# Patient Record
Sex: FEMALE | Race: WHITE | NOT HISPANIC OR LATINO | Employment: UNEMPLOYED | URBAN - METROPOLITAN AREA
[De-identification: names, ages, dates, MRNs, and addresses within clinical notes are randomized per-mention and may not be internally consistent; named-entity substitution may affect disease eponyms.]

---

## 2017-01-04 ENCOUNTER — ALLSCRIPTS OFFICE VISIT (OUTPATIENT)
Dept: OTHER | Facility: OTHER | Age: 62
End: 2017-01-04

## 2017-01-04 DIAGNOSIS — M79.10 MYALGIA: ICD-10-CM

## 2017-01-18 ENCOUNTER — GENERIC CONVERSION - ENCOUNTER (OUTPATIENT)
Dept: OTHER | Facility: OTHER | Age: 62
End: 2017-01-18

## 2017-01-18 ENCOUNTER — APPOINTMENT (OUTPATIENT)
Dept: PHYSICAL THERAPY | Facility: OTHER | Age: 62
End: 2017-01-18
Payer: COMMERCIAL

## 2017-01-18 DIAGNOSIS — M79.10 MYALGIA: ICD-10-CM

## 2017-01-18 PROCEDURE — G8990 OTHER PT/OT CURRENT STATUS: HCPCS

## 2017-01-18 PROCEDURE — G8991 OTHER PT/OT GOAL STATUS: HCPCS

## 2017-01-18 PROCEDURE — 97164 PT RE-EVAL EST PLAN CARE: CPT

## 2017-01-18 PROCEDURE — 97140 MANUAL THERAPY 1/> REGIONS: CPT

## 2017-01-20 ENCOUNTER — APPOINTMENT (OUTPATIENT)
Dept: PHYSICAL THERAPY | Facility: OTHER | Age: 62
End: 2017-01-20
Payer: COMMERCIAL

## 2017-01-20 PROCEDURE — 97110 THERAPEUTIC EXERCISES: CPT

## 2017-01-20 PROCEDURE — 97140 MANUAL THERAPY 1/> REGIONS: CPT

## 2017-01-25 ENCOUNTER — APPOINTMENT (OUTPATIENT)
Dept: PHYSICAL THERAPY | Facility: OTHER | Age: 62
End: 2017-01-25
Payer: COMMERCIAL

## 2017-01-25 PROCEDURE — 97110 THERAPEUTIC EXERCISES: CPT

## 2017-01-25 PROCEDURE — 97140 MANUAL THERAPY 1/> REGIONS: CPT

## 2017-01-27 ENCOUNTER — APPOINTMENT (OUTPATIENT)
Dept: PHYSICAL THERAPY | Facility: OTHER | Age: 62
End: 2017-01-27
Payer: COMMERCIAL

## 2017-01-27 ENCOUNTER — APPOINTMENT (OUTPATIENT)
Dept: LAB | Facility: HOSPITAL | Age: 62
End: 2017-01-27
Attending: NEUROLOGICAL SURGERY
Payer: MEDICARE

## 2017-01-27 ENCOUNTER — TRANSCRIBE ORDERS (OUTPATIENT)
Dept: LAB | Facility: HOSPITAL | Age: 62
End: 2017-01-27

## 2017-01-27 DIAGNOSIS — D32.9 BENIGN NEOPLASM OF MENINGES (HCC): ICD-10-CM

## 2017-01-27 LAB
BUN SERPL-MCNC: 19 MG/DL (ref 5–25)
CREAT SERPL-MCNC: 1.03 MG/DL (ref 0.6–1.3)
GFR SERPL CREATININE-BSD FRML MDRD: 54.1 ML/MIN/1.73SQ M

## 2017-01-27 PROCEDURE — 84520 ASSAY OF UREA NITROGEN: CPT

## 2017-01-27 PROCEDURE — 82565 ASSAY OF CREATININE: CPT

## 2017-01-27 PROCEDURE — 97110 THERAPEUTIC EXERCISES: CPT

## 2017-01-27 PROCEDURE — 36415 COLL VENOUS BLD VENIPUNCTURE: CPT

## 2017-01-27 PROCEDURE — 97140 MANUAL THERAPY 1/> REGIONS: CPT

## 2017-02-01 ENCOUNTER — APPOINTMENT (OUTPATIENT)
Dept: PHYSICAL THERAPY | Facility: OTHER | Age: 62
End: 2017-02-01
Payer: COMMERCIAL

## 2017-02-01 ENCOUNTER — HOSPITAL ENCOUNTER (OUTPATIENT)
Dept: RADIOLOGY | Facility: HOSPITAL | Age: 62
Discharge: HOME | End: 2017-02-01
Attending: NEUROLOGICAL SURGERY
Payer: MEDICARE

## 2017-02-01 DIAGNOSIS — D32.9 BENIGN NEOPLASM OF MENINGES (HCC): ICD-10-CM

## 2017-02-01 PROCEDURE — A9585 GADOBUTROL INJECTION: HCPCS | Performed by: NEUROLOGICAL SURGERY

## 2017-02-01 PROCEDURE — 70553 MRI BRAIN STEM W/O & W/DYE: CPT

## 2017-02-01 RX ADMIN — GADOBUTROL 9 ML: 604.72 INJECTION INTRAVENOUS at 09:58

## 2017-02-03 ENCOUNTER — APPOINTMENT (OUTPATIENT)
Dept: PHYSICAL THERAPY | Facility: OTHER | Age: 62
End: 2017-02-03
Payer: COMMERCIAL

## 2017-02-08 ENCOUNTER — APPOINTMENT (OUTPATIENT)
Dept: PHYSICAL THERAPY | Facility: OTHER | Age: 62
End: 2017-02-08
Payer: COMMERCIAL

## 2017-02-08 PROCEDURE — 97140 MANUAL THERAPY 1/> REGIONS: CPT

## 2017-02-08 PROCEDURE — 97110 THERAPEUTIC EXERCISES: CPT

## 2017-02-10 ENCOUNTER — TRANSCRIBE ORDERS (OUTPATIENT)
Dept: ADMINISTRATIVE | Facility: HOSPITAL | Age: 62
End: 2017-02-10

## 2017-02-10 ENCOUNTER — APPOINTMENT (OUTPATIENT)
Dept: PHYSICAL THERAPY | Facility: OTHER | Age: 62
End: 2017-02-10
Payer: COMMERCIAL

## 2017-02-10 ENCOUNTER — ALLSCRIPTS OFFICE VISIT (OUTPATIENT)
Dept: OTHER | Facility: OTHER | Age: 62
End: 2017-02-10

## 2017-02-10 DIAGNOSIS — D32.9 BENIGN NEOPLASM OF MENINGES (HCC): ICD-10-CM

## 2017-02-10 DIAGNOSIS — D32.9 BENIGN NEOPLASM OF MENINGES (HCC): Primary | ICD-10-CM

## 2017-02-10 PROCEDURE — 97140 MANUAL THERAPY 1/> REGIONS: CPT

## 2017-02-10 PROCEDURE — 97110 THERAPEUTIC EXERCISES: CPT

## 2017-02-15 ENCOUNTER — APPOINTMENT (OUTPATIENT)
Dept: PHYSICAL THERAPY | Facility: OTHER | Age: 62
End: 2017-02-15
Payer: COMMERCIAL

## 2017-02-17 ENCOUNTER — APPOINTMENT (OUTPATIENT)
Dept: PHYSICAL THERAPY | Facility: OTHER | Age: 62
End: 2017-02-17
Payer: COMMERCIAL

## 2017-02-17 PROCEDURE — 97110 THERAPEUTIC EXERCISES: CPT

## 2017-02-17 PROCEDURE — 97140 MANUAL THERAPY 1/> REGIONS: CPT

## 2017-02-22 ENCOUNTER — APPOINTMENT (OUTPATIENT)
Dept: PHYSICAL THERAPY | Facility: OTHER | Age: 62
End: 2017-02-22
Payer: COMMERCIAL

## 2017-02-22 PROCEDURE — 97140 MANUAL THERAPY 1/> REGIONS: CPT

## 2017-02-22 PROCEDURE — 97110 THERAPEUTIC EXERCISES: CPT

## 2017-02-24 ENCOUNTER — APPOINTMENT (OUTPATIENT)
Dept: PHYSICAL THERAPY | Facility: OTHER | Age: 62
End: 2017-02-24
Payer: COMMERCIAL

## 2017-02-24 PROCEDURE — G8990 OTHER PT/OT CURRENT STATUS: HCPCS

## 2017-02-24 PROCEDURE — G8991 OTHER PT/OT GOAL STATUS: HCPCS

## 2017-02-24 PROCEDURE — 97140 MANUAL THERAPY 1/> REGIONS: CPT

## 2017-02-24 PROCEDURE — 97110 THERAPEUTIC EXERCISES: CPT

## 2017-03-01 ENCOUNTER — APPOINTMENT (OUTPATIENT)
Dept: PHYSICAL THERAPY | Facility: OTHER | Age: 62
End: 2017-03-01
Payer: COMMERCIAL

## 2017-03-01 PROCEDURE — 97110 THERAPEUTIC EXERCISES: CPT

## 2017-03-01 PROCEDURE — 97140 MANUAL THERAPY 1/> REGIONS: CPT

## 2017-03-03 ENCOUNTER — APPOINTMENT (OUTPATIENT)
Dept: PHYSICAL THERAPY | Facility: OTHER | Age: 62
End: 2017-03-03
Payer: COMMERCIAL

## 2017-03-03 PROCEDURE — 97110 THERAPEUTIC EXERCISES: CPT

## 2017-03-03 PROCEDURE — 97140 MANUAL THERAPY 1/> REGIONS: CPT

## 2017-03-08 ENCOUNTER — APPOINTMENT (OUTPATIENT)
Dept: PHYSICAL THERAPY | Facility: OTHER | Age: 62
End: 2017-03-08
Payer: COMMERCIAL

## 2017-03-08 PROCEDURE — 97110 THERAPEUTIC EXERCISES: CPT

## 2017-03-08 PROCEDURE — 97140 MANUAL THERAPY 1/> REGIONS: CPT

## 2017-03-10 ENCOUNTER — APPOINTMENT (OUTPATIENT)
Dept: PHYSICAL THERAPY | Facility: OTHER | Age: 62
End: 2017-03-10
Payer: COMMERCIAL

## 2017-03-15 ENCOUNTER — APPOINTMENT (OUTPATIENT)
Dept: PHYSICAL THERAPY | Facility: OTHER | Age: 62
End: 2017-03-15
Payer: COMMERCIAL

## 2017-03-17 ENCOUNTER — APPOINTMENT (OUTPATIENT)
Dept: PHYSICAL THERAPY | Facility: OTHER | Age: 62
End: 2017-03-17
Payer: COMMERCIAL

## 2017-03-17 PROCEDURE — 97140 MANUAL THERAPY 1/> REGIONS: CPT

## 2017-03-17 PROCEDURE — 97110 THERAPEUTIC EXERCISES: CPT

## 2017-03-22 ENCOUNTER — APPOINTMENT (OUTPATIENT)
Dept: PHYSICAL THERAPY | Facility: OTHER | Age: 62
End: 2017-03-22
Payer: COMMERCIAL

## 2017-03-22 PROCEDURE — 97110 THERAPEUTIC EXERCISES: CPT

## 2017-03-22 PROCEDURE — 97140 MANUAL THERAPY 1/> REGIONS: CPT

## 2017-03-24 ENCOUNTER — APPOINTMENT (OUTPATIENT)
Dept: PHYSICAL THERAPY | Facility: OTHER | Age: 62
End: 2017-03-24
Payer: COMMERCIAL

## 2017-03-29 ENCOUNTER — APPOINTMENT (OUTPATIENT)
Dept: PHYSICAL THERAPY | Facility: OTHER | Age: 62
End: 2017-03-29
Payer: COMMERCIAL

## 2017-03-29 PROCEDURE — 97110 THERAPEUTIC EXERCISES: CPT

## 2017-03-29 PROCEDURE — 97140 MANUAL THERAPY 1/> REGIONS: CPT

## 2017-03-31 ENCOUNTER — APPOINTMENT (OUTPATIENT)
Dept: PHYSICAL THERAPY | Facility: OTHER | Age: 62
End: 2017-03-31
Payer: COMMERCIAL

## 2017-03-31 PROCEDURE — 97140 MANUAL THERAPY 1/> REGIONS: CPT

## 2017-03-31 PROCEDURE — 97110 THERAPEUTIC EXERCISES: CPT

## 2017-04-07 ENCOUNTER — APPOINTMENT (OUTPATIENT)
Dept: PHYSICAL THERAPY | Facility: OTHER | Age: 62
End: 2017-04-07
Payer: COMMERCIAL

## 2017-04-07 PROCEDURE — 97110 THERAPEUTIC EXERCISES: CPT

## 2017-04-07 PROCEDURE — 97140 MANUAL THERAPY 1/> REGIONS: CPT

## 2017-04-12 ENCOUNTER — APPOINTMENT (OUTPATIENT)
Dept: PHYSICAL THERAPY | Facility: OTHER | Age: 62
End: 2017-04-12
Payer: COMMERCIAL

## 2017-04-12 PROCEDURE — 97110 THERAPEUTIC EXERCISES: CPT

## 2017-04-12 PROCEDURE — 97140 MANUAL THERAPY 1/> REGIONS: CPT

## 2017-04-14 ENCOUNTER — APPOINTMENT (OUTPATIENT)
Dept: PHYSICAL THERAPY | Facility: OTHER | Age: 62
End: 2017-04-14
Payer: COMMERCIAL

## 2017-04-14 ENCOUNTER — ALLSCRIPTS OFFICE VISIT (OUTPATIENT)
Dept: OTHER | Facility: OTHER | Age: 62
End: 2017-04-14

## 2017-04-14 PROCEDURE — 97110 THERAPEUTIC EXERCISES: CPT

## 2017-04-14 PROCEDURE — 97140 MANUAL THERAPY 1/> REGIONS: CPT

## 2017-04-14 PROCEDURE — G8990 OTHER PT/OT CURRENT STATUS: HCPCS

## 2017-04-14 PROCEDURE — G8991 OTHER PT/OT GOAL STATUS: HCPCS

## 2017-04-19 ENCOUNTER — APPOINTMENT (OUTPATIENT)
Dept: PHYSICAL THERAPY | Facility: OTHER | Age: 62
End: 2017-04-19
Payer: COMMERCIAL

## 2017-04-19 PROCEDURE — 97110 THERAPEUTIC EXERCISES: CPT

## 2017-04-19 PROCEDURE — 97140 MANUAL THERAPY 1/> REGIONS: CPT

## 2017-04-21 ENCOUNTER — APPOINTMENT (OUTPATIENT)
Dept: PHYSICAL THERAPY | Facility: OTHER | Age: 62
End: 2017-04-21
Payer: COMMERCIAL

## 2017-04-21 PROCEDURE — 97140 MANUAL THERAPY 1/> REGIONS: CPT

## 2017-04-21 PROCEDURE — 97110 THERAPEUTIC EXERCISES: CPT

## 2017-04-21 PROCEDURE — 97112 NEUROMUSCULAR REEDUCATION: CPT

## 2017-04-25 ENCOUNTER — GENERIC CONVERSION - ENCOUNTER (OUTPATIENT)
Dept: OTHER | Facility: OTHER | Age: 62
End: 2017-04-25

## 2017-04-26 ENCOUNTER — APPOINTMENT (OUTPATIENT)
Dept: PHYSICAL THERAPY | Facility: OTHER | Age: 62
End: 2017-04-26
Payer: COMMERCIAL

## 2017-04-26 PROCEDURE — 97110 THERAPEUTIC EXERCISES: CPT

## 2017-04-26 PROCEDURE — 97112 NEUROMUSCULAR REEDUCATION: CPT

## 2017-04-26 PROCEDURE — 97140 MANUAL THERAPY 1/> REGIONS: CPT

## 2017-05-03 ENCOUNTER — ALLSCRIPTS OFFICE VISIT (OUTPATIENT)
Dept: OTHER | Facility: OTHER | Age: 62
End: 2017-05-03

## 2017-05-03 ENCOUNTER — APPOINTMENT (OUTPATIENT)
Dept: PHYSICAL THERAPY | Facility: OTHER | Age: 62
End: 2017-05-03
Payer: COMMERCIAL

## 2017-05-03 DIAGNOSIS — E11.65 TYPE 2 DIABETES MELLITUS WITH HYPERGLYCEMIA (HCC): ICD-10-CM

## 2017-05-03 DIAGNOSIS — Z12.31 ENCOUNTER FOR SCREENING MAMMOGRAM FOR MALIGNANT NEOPLASM OF BREAST: ICD-10-CM

## 2017-05-03 DIAGNOSIS — I10 ESSENTIAL (PRIMARY) HYPERTENSION: ICD-10-CM

## 2017-05-03 DIAGNOSIS — F41.8 OTHER SPECIFIED ANXIETY DISORDERS: ICD-10-CM

## 2017-05-03 DIAGNOSIS — E66.9 OBESITY: ICD-10-CM

## 2017-05-03 DIAGNOSIS — E78.5 HYPERLIPIDEMIA: ICD-10-CM

## 2017-05-03 PROCEDURE — 97112 NEUROMUSCULAR REEDUCATION: CPT

## 2017-05-03 PROCEDURE — 97140 MANUAL THERAPY 1/> REGIONS: CPT

## 2017-05-05 ENCOUNTER — APPOINTMENT (OUTPATIENT)
Dept: PHYSICAL THERAPY | Facility: OTHER | Age: 62
End: 2017-05-05
Payer: COMMERCIAL

## 2017-05-05 ENCOUNTER — ALLSCRIPTS OFFICE VISIT (OUTPATIENT)
Dept: RADIOLOGY | Facility: CLINIC | Age: 62
End: 2017-05-05
Payer: COMMERCIAL

## 2017-05-05 PROCEDURE — 97140 MANUAL THERAPY 1/> REGIONS: CPT

## 2017-05-05 PROCEDURE — 77002 NEEDLE LOCALIZATION BY XRAY: CPT | Performed by: ANESTHESIOLOGY

## 2017-05-05 PROCEDURE — 97112 NEUROMUSCULAR REEDUCATION: CPT

## 2017-05-09 ENCOUNTER — HOSPITAL ENCOUNTER (EMERGENCY)
Facility: HOSPITAL | Age: 62
Discharge: HOME | End: 2017-05-09
Attending: EMERGENCY MEDICINE | Admitting: EMERGENCY MEDICINE
Payer: MEDICARE

## 2017-05-09 VITALS
BODY MASS INDEX: 34.33 KG/M2 | SYSTOLIC BLOOD PRESSURE: 125 MMHG | TEMPERATURE: 98.5 F | HEART RATE: 73 BPM | DIASTOLIC BLOOD PRESSURE: 68 MMHG | OXYGEN SATURATION: 98 % | WEIGHT: 200 LBS | RESPIRATION RATE: 20 BRPM

## 2017-05-09 DIAGNOSIS — R42 EPISODIC LIGHTHEADEDNESS: Primary | ICD-10-CM

## 2017-05-09 DIAGNOSIS — I95.9 HYPOTENSION: ICD-10-CM

## 2017-05-09 LAB
ANION GAP SERPL CALCULATED.3IONS-SCNC: 9 MMOL/L (ref 4–13)
BASOPHILS # BLD AUTO: 0.1 THOUSANDS/ΜL (ref 0–0.1)
BASOPHILS NFR BLD AUTO: 1 % (ref 0–1)
BUN SERPL-MCNC: 14 MG/DL (ref 5–25)
CALCIUM SERPL-MCNC: 9.1 MG/DL (ref 8.3–10.1)
CHLORIDE SERPL-SCNC: 109 MMOL/L (ref 100–108)
CO2 SERPL-SCNC: 25 MMOL/L (ref 21–32)
CREAT SERPL-MCNC: 1.11 MG/DL (ref 0.6–1.3)
EOSINOPHIL # BLD AUTO: 0.29 THOUSAND/ΜL (ref 0–0.61)
EOSINOPHIL NFR BLD AUTO: 3 % (ref 0–6)
ERYTHROCYTE [DISTWIDTH] IN BLOOD BY AUTOMATED COUNT: 13.5 % (ref 11.6–15.1)
GFR SERPL CREATININE-BSD FRML MDRD: 49.6 ML/MIN/1.73SQ M
GLUCOSE SERPL-MCNC: 115 MG/DL (ref 65–140)
GLUCOSE SERPL-MCNC: 162 MG/DL (ref 65–140)
HCT VFR BLD AUTO: 42.6 % (ref 34.8–46.1)
HGB BLD-MCNC: 14.3 G/DL (ref 11.5–15.4)
LYMPHOCYTES # BLD AUTO: 2.72 THOUSANDS/ΜL (ref 0.6–4.47)
LYMPHOCYTES NFR BLD AUTO: 27 % (ref 14–44)
MAGNESIUM SERPL-MCNC: 2.2 MG/DL (ref 1.6–2.6)
MCH RBC QN AUTO: 30 PG (ref 26.8–34.3)
MCHC RBC AUTO-ENTMCNC: 33.6 G/DL (ref 31.4–37.4)
MCV RBC AUTO: 89 FL (ref 82–98)
MONOCYTES # BLD AUTO: 0.62 THOUSAND/ΜL (ref 0.17–1.22)
MONOCYTES NFR BLD AUTO: 6 % (ref 4–12)
NEUTROPHILS # BLD AUTO: 6.34 THOUSANDS/ΜL (ref 1.85–7.62)
NEUTS SEG NFR BLD AUTO: 63 % (ref 43–75)
NRBC BLD AUTO-RTO: 0 /100 WBCS
PLATELET # BLD AUTO: 370 THOUSANDS/UL (ref 149–390)
PMV BLD AUTO: 11.9 FL (ref 8.9–12.7)
POTASSIUM SERPL-SCNC: 3.4 MMOL/L (ref 3.5–5.3)
RBC # BLD AUTO: 4.77 MILLION/UL (ref 3.81–5.12)
SODIUM SERPL-SCNC: 143 MMOL/L (ref 136–145)
SPECIMEN SOURCE: NORMAL
TROPONIN I BLD-MCNC: 0 NG/ML (ref 0–0.08)
WBC # BLD AUTO: 10.11 THOUSAND/UL (ref 4.31–10.16)

## 2017-05-09 PROCEDURE — 85025 COMPLETE CBC W/AUTO DIFF WBC: CPT | Performed by: EMERGENCY MEDICINE

## 2017-05-09 PROCEDURE — 36415 COLL VENOUS BLD VENIPUNCTURE: CPT | Performed by: EMERGENCY MEDICINE

## 2017-05-09 PROCEDURE — 84484 ASSAY OF TROPONIN QUANT: CPT

## 2017-05-09 PROCEDURE — 80048 BASIC METABOLIC PNL TOTAL CA: CPT | Performed by: EMERGENCY MEDICINE

## 2017-05-09 PROCEDURE — 83735 ASSAY OF MAGNESIUM: CPT | Performed by: EMERGENCY MEDICINE

## 2017-05-09 PROCEDURE — 93005 ELECTROCARDIOGRAM TRACING: CPT | Performed by: EMERGENCY MEDICINE

## 2017-05-09 PROCEDURE — 82948 REAGENT STRIP/BLOOD GLUCOSE: CPT

## 2017-05-09 PROCEDURE — 99285 EMERGENCY DEPT VISIT HI MDM: CPT

## 2017-05-09 RX ORDER — OXYCODONE HYDROCHLORIDE AND ACETAMINOPHEN 5; 325 MG/1; MG/1
TABLET ORAL
COMMUNITY
End: 2018-02-16

## 2017-05-09 RX ORDER — LISINOPRIL 5 MG/1
5 TABLET ORAL
COMMUNITY
End: 2018-04-27 | Stop reason: CLARIF

## 2017-05-09 RX ORDER — ALPRAZOLAM 0.5 MG/1
0.5 TABLET ORAL DAILY PRN
COMMUNITY

## 2017-05-09 RX ORDER — METHYLPHENIDATE HYDROCHLORIDE 5 MG/1
5 TABLET ORAL
COMMUNITY
End: 2018-02-16

## 2017-05-09 RX ORDER — ATORVASTATIN CALCIUM 40 MG/1
40 TABLET, FILM COATED ORAL
COMMUNITY
End: 2018-05-26 | Stop reason: SDUPTHER

## 2017-05-09 RX ORDER — MELOXICAM 7.5 MG/1
15 TABLET ORAL
COMMUNITY
End: 2018-02-16

## 2017-05-09 RX ORDER — CLONAZEPAM 1 MG/1
TABLET, ORALLY DISINTEGRATING ORAL
COMMUNITY
End: 2018-02-20 | Stop reason: DRUGHIGH

## 2017-05-09 RX ORDER — POTASSIUM CHLORIDE 20 MEQ/1
20 TABLET, EXTENDED RELEASE ORAL ONCE
Status: COMPLETED | OUTPATIENT
Start: 2017-05-09 | End: 2017-05-09

## 2017-05-09 RX ORDER — DIVALPROEX SODIUM 500 MG/1
500 TABLET, EXTENDED RELEASE ORAL
COMMUNITY
End: 2018-02-16

## 2017-05-09 RX ADMIN — POTASSIUM CHLORIDE 20 MEQ: 1500 TABLET, EXTENDED RELEASE ORAL at 18:35

## 2017-05-10 ENCOUNTER — APPOINTMENT (OUTPATIENT)
Dept: PHYSICAL THERAPY | Facility: OTHER | Age: 62
End: 2017-05-10
Payer: COMMERCIAL

## 2017-05-10 LAB
ATRIAL RATE: 68 BPM
P AXIS: 53 DEGREES
PR INTERVAL: 148 MS
QRS AXIS: 27 DEGREES
QRSD INTERVAL: 82 MS
QT INTERVAL: 396 MS
QTC INTERVAL: 421 MS
T WAVE AXIS: 40 DEGREES
VENTRICULAR RATE: 68 BPM

## 2017-05-12 ENCOUNTER — APPOINTMENT (OUTPATIENT)
Dept: PHYSICAL THERAPY | Facility: OTHER | Age: 62
End: 2017-05-12
Payer: COMMERCIAL

## 2017-05-12 PROCEDURE — 97110 THERAPEUTIC EXERCISES: CPT

## 2017-05-12 PROCEDURE — 97140 MANUAL THERAPY 1/> REGIONS: CPT

## 2017-05-12 PROCEDURE — 97112 NEUROMUSCULAR REEDUCATION: CPT

## 2017-05-17 ENCOUNTER — APPOINTMENT (OUTPATIENT)
Dept: PHYSICAL THERAPY | Facility: OTHER | Age: 62
End: 2017-05-17
Payer: COMMERCIAL

## 2017-05-17 PROCEDURE — 97140 MANUAL THERAPY 1/> REGIONS: CPT

## 2017-05-17 PROCEDURE — 97110 THERAPEUTIC EXERCISES: CPT

## 2017-05-19 ENCOUNTER — APPOINTMENT (OUTPATIENT)
Dept: PHYSICAL THERAPY | Facility: OTHER | Age: 62
End: 2017-05-19
Payer: COMMERCIAL

## 2017-05-19 PROCEDURE — 97140 MANUAL THERAPY 1/> REGIONS: CPT

## 2017-05-19 PROCEDURE — 97112 NEUROMUSCULAR REEDUCATION: CPT

## 2017-05-19 PROCEDURE — 97110 THERAPEUTIC EXERCISES: CPT

## 2017-05-24 ENCOUNTER — APPOINTMENT (OUTPATIENT)
Dept: PHYSICAL THERAPY | Facility: OTHER | Age: 62
End: 2017-05-24
Payer: COMMERCIAL

## 2017-05-24 PROCEDURE — 97140 MANUAL THERAPY 1/> REGIONS: CPT

## 2017-05-24 PROCEDURE — 97112 NEUROMUSCULAR REEDUCATION: CPT

## 2017-05-24 PROCEDURE — 97110 THERAPEUTIC EXERCISES: CPT

## 2017-05-26 ENCOUNTER — APPOINTMENT (OUTPATIENT)
Dept: PHYSICAL THERAPY | Facility: OTHER | Age: 62
End: 2017-05-26
Payer: COMMERCIAL

## 2017-05-26 PROCEDURE — 97112 NEUROMUSCULAR REEDUCATION: CPT

## 2017-05-26 PROCEDURE — 97140 MANUAL THERAPY 1/> REGIONS: CPT

## 2017-05-26 PROCEDURE — 97110 THERAPEUTIC EXERCISES: CPT

## 2017-05-31 ENCOUNTER — APPOINTMENT (OUTPATIENT)
Dept: PHYSICAL THERAPY | Facility: OTHER | Age: 62
End: 2017-05-31
Payer: COMMERCIAL

## 2017-06-02 ENCOUNTER — APPOINTMENT (OUTPATIENT)
Dept: PHYSICAL THERAPY | Facility: OTHER | Age: 62
End: 2017-06-02
Payer: COMMERCIAL

## 2017-06-02 PROCEDURE — G8991 OTHER PT/OT GOAL STATUS: HCPCS

## 2017-06-02 PROCEDURE — 97112 NEUROMUSCULAR REEDUCATION: CPT

## 2017-06-02 PROCEDURE — G8990 OTHER PT/OT CURRENT STATUS: HCPCS

## 2017-06-02 PROCEDURE — 97110 THERAPEUTIC EXERCISES: CPT

## 2017-06-02 PROCEDURE — 97140 MANUAL THERAPY 1/> REGIONS: CPT

## 2017-06-22 ENCOUNTER — ALLSCRIPTS OFFICE VISIT (OUTPATIENT)
Dept: OTHER | Facility: OTHER | Age: 62
End: 2017-06-22

## 2017-06-23 ENCOUNTER — GENERIC CONVERSION - ENCOUNTER (OUTPATIENT)
Dept: OTHER | Facility: OTHER | Age: 62
End: 2017-06-23

## 2017-06-24 ENCOUNTER — TRANSCRIBE ORDERS (OUTPATIENT)
Dept: ADMINISTRATIVE | Facility: HOSPITAL | Age: 62
End: 2017-06-24

## 2017-06-24 DIAGNOSIS — M47.812 CERVICAL FACET JOINT SYNDROME: Primary | ICD-10-CM

## 2017-06-30 ENCOUNTER — HOSPITAL ENCOUNTER (OUTPATIENT)
Dept: RADIOLOGY | Facility: HOSPITAL | Age: 62
Discharge: HOME | End: 2017-06-30
Attending: ANESTHESIOLOGY
Payer: COMMERCIAL

## 2017-06-30 DIAGNOSIS — M47.812 CERVICAL FACET JOINT SYNDROME: ICD-10-CM

## 2017-06-30 PROCEDURE — 72141 MRI NECK SPINE W/O DYE: CPT

## 2017-07-07 ENCOUNTER — TRANSCRIBE ORDERS (OUTPATIENT)
Dept: LAB | Facility: HOSPITAL | Age: 62
End: 2017-07-07

## 2017-07-07 ENCOUNTER — APPOINTMENT (OUTPATIENT)
Dept: LAB | Facility: HOSPITAL | Age: 62
End: 2017-07-07
Attending: NEUROLOGICAL SURGERY
Payer: MEDICARE

## 2017-07-07 DIAGNOSIS — D32.9 BENIGN NEOPLASM OF MENINGES (HCC): ICD-10-CM

## 2017-07-07 LAB
BUN SERPL-MCNC: 12 MG/DL (ref 5–25)
CREAT SERPL-MCNC: 0.9 MG/DL (ref 0.6–1.3)
GFR SERPL CREATININE-BSD FRML MDRD: >60 ML/MIN/1.73SQ M

## 2017-07-07 PROCEDURE — 36415 COLL VENOUS BLD VENIPUNCTURE: CPT

## 2017-07-07 PROCEDURE — 84520 ASSAY OF UREA NITROGEN: CPT

## 2017-07-07 PROCEDURE — 82565 ASSAY OF CREATININE: CPT

## 2017-07-13 ENCOUNTER — ALLSCRIPTS OFFICE VISIT (OUTPATIENT)
Dept: RADIOLOGY | Facility: CLINIC | Age: 62
End: 2017-07-13
Payer: COMMERCIAL

## 2017-08-01 ENCOUNTER — ALLSCRIPTS OFFICE VISIT (OUTPATIENT)
Dept: RADIOLOGY | Facility: CLINIC | Age: 62
End: 2017-08-01
Payer: COMMERCIAL

## 2017-08-15 ENCOUNTER — ALLSCRIPTS OFFICE VISIT (OUTPATIENT)
Dept: OTHER | Facility: OTHER | Age: 62
End: 2017-08-15

## 2017-08-25 ENCOUNTER — ALLSCRIPTS OFFICE VISIT (OUTPATIENT)
Dept: OTHER | Facility: OTHER | Age: 62
End: 2017-08-25

## 2017-08-25 ENCOUNTER — GENERIC CONVERSION - ENCOUNTER (OUTPATIENT)
Dept: OTHER | Facility: OTHER | Age: 62
End: 2017-08-25

## 2017-08-25 ENCOUNTER — TRANSCRIBE ORDERS (OUTPATIENT)
Dept: LAB | Facility: HOSPITAL | Age: 62
End: 2017-08-25

## 2017-08-25 ENCOUNTER — APPOINTMENT (OUTPATIENT)
Dept: LAB | Facility: HOSPITAL | Age: 62
End: 2017-08-25
Payer: MEDICARE

## 2017-08-25 DIAGNOSIS — I95.9 HYPOTENSION: ICD-10-CM

## 2017-08-25 DIAGNOSIS — M25.562 PAIN IN LEFT KNEE: ICD-10-CM

## 2017-08-25 DIAGNOSIS — R53.83 OTHER FATIGUE: ICD-10-CM

## 2017-08-25 DIAGNOSIS — Z12.31 ENCOUNTER FOR SCREENING MAMMOGRAM FOR MALIGNANT NEOPLASM OF BREAST: ICD-10-CM

## 2017-08-25 LAB
ALBUMIN SERPL BCP-MCNC: 3.6 G/DL (ref 3.5–5)
ALP SERPL-CCNC: 85 U/L (ref 46–116)
ALT SERPL W P-5'-P-CCNC: 18 U/L (ref 12–78)
ANION GAP SERPL CALCULATED.3IONS-SCNC: 7 MMOL/L (ref 4–13)
AST SERPL W P-5'-P-CCNC: 28 U/L (ref 5–45)
BASOPHILS # BLD AUTO: 0.12 THOUSANDS/ΜL (ref 0–0.1)
BASOPHILS NFR BLD AUTO: 2 % (ref 0–1)
BILIRUB SERPL-MCNC: 0.51 MG/DL (ref 0.2–1)
BUN SERPL-MCNC: 12 MG/DL (ref 5–25)
CALCIUM SERPL-MCNC: 8.4 MG/DL (ref 8.3–10.1)
CHLORIDE SERPL-SCNC: 106 MMOL/L (ref 100–108)
CO2 SERPL-SCNC: 25 MMOL/L (ref 21–32)
CREAT SERPL-MCNC: 0.87 MG/DL (ref 0.6–1.3)
EOSINOPHIL # BLD AUTO: 0.26 THOUSAND/ΜL (ref 0–0.61)
EOSINOPHIL NFR BLD AUTO: 3 % (ref 0–6)
ERYTHROCYTE [DISTWIDTH] IN BLOOD BY AUTOMATED COUNT: 13.2 % (ref 11.6–15.1)
GFR SERPL CREATININE-BSD FRML MDRD: 71 ML/MIN/1.73SQ M
GLUCOSE SERPL-MCNC: 113 MG/DL (ref 65–140)
HCT VFR BLD AUTO: 40.8 % (ref 34.8–46.1)
HGB BLD-MCNC: 13.3 G/DL (ref 11.5–15.4)
LYMPHOCYTES # BLD AUTO: 2.12 THOUSANDS/ΜL (ref 0.6–4.47)
LYMPHOCYTES NFR BLD AUTO: 28 % (ref 14–44)
MCH RBC QN AUTO: 29.8 PG (ref 26.8–34.3)
MCHC RBC AUTO-ENTMCNC: 32.6 G/DL (ref 31.4–37.4)
MCV RBC AUTO: 91 FL (ref 82–98)
MONOCYTES # BLD AUTO: 0.69 THOUSAND/ΜL (ref 0.17–1.22)
MONOCYTES NFR BLD AUTO: 9 % (ref 4–12)
NEUTROPHILS # BLD AUTO: 4.44 THOUSANDS/ΜL (ref 1.85–7.62)
NEUTS SEG NFR BLD AUTO: 58 % (ref 43–75)
NRBC BLD AUTO-RTO: 0 /100 WBCS
PLATELET # BLD AUTO: 312 THOUSANDS/UL (ref 149–390)
PMV BLD AUTO: 12 FL (ref 8.9–12.7)
POTASSIUM SERPL-SCNC: 4 MMOL/L (ref 3.5–5.3)
PROT SERPL-MCNC: 7.7 G/DL (ref 6.4–8.2)
RBC # BLD AUTO: 4.47 MILLION/UL (ref 3.81–5.12)
SODIUM SERPL-SCNC: 138 MMOL/L (ref 136–145)
TSH SERPL DL<=0.05 MIU/L-ACNC: 1.07 UIU/ML (ref 0.36–3.74)
WBC # BLD AUTO: 7.65 THOUSAND/UL (ref 4.31–10.16)

## 2017-08-25 PROCEDURE — 80053 COMPREHEN METABOLIC PANEL: CPT

## 2017-08-25 PROCEDURE — 85025 COMPLETE CBC W/AUTO DIFF WBC: CPT

## 2017-08-25 PROCEDURE — 84443 ASSAY THYROID STIM HORMONE: CPT

## 2017-08-25 PROCEDURE — 36415 COLL VENOUS BLD VENIPUNCTURE: CPT

## 2017-08-31 ENCOUNTER — GENERIC CONVERSION - ENCOUNTER (OUTPATIENT)
Dept: OTHER | Facility: OTHER | Age: 62
End: 2017-08-31

## 2017-09-07 ENCOUNTER — ALLSCRIPTS OFFICE VISIT (OUTPATIENT)
Dept: RADIOLOGY | Facility: CLINIC | Age: 62
End: 2017-09-07
Payer: COMMERCIAL

## 2017-09-08 ENCOUNTER — GENERIC CONVERSION - ENCOUNTER (OUTPATIENT)
Dept: OTHER | Facility: OTHER | Age: 62
End: 2017-09-08

## 2017-09-20 ENCOUNTER — GENERIC CONVERSION - ENCOUNTER (OUTPATIENT)
Dept: OTHER | Facility: OTHER | Age: 62
End: 2017-09-20

## 2017-09-21 ENCOUNTER — GENERIC CONVERSION - ENCOUNTER (OUTPATIENT)
Dept: FAMILY MEDICINE CLINIC | Facility: CLINIC | Age: 62
End: 2017-09-21

## 2017-09-21 ENCOUNTER — HOSPITAL ENCOUNTER (OUTPATIENT)
Dept: RADIOLOGY | Facility: HOSPITAL | Age: 62
Discharge: HOME | End: 2017-09-21
Payer: MEDICARE

## 2017-09-21 ENCOUNTER — GENERIC CONVERSION - ENCOUNTER (OUTPATIENT)
Dept: OTHER | Facility: OTHER | Age: 62
End: 2017-09-21

## 2017-09-21 DIAGNOSIS — M25.562 PAIN IN LEFT KNEE: ICD-10-CM

## 2017-09-21 PROCEDURE — 73562 X-RAY EXAM OF KNEE 3: CPT

## 2017-09-25 ENCOUNTER — GENERIC CONVERSION - ENCOUNTER (OUTPATIENT)
Dept: OTHER | Facility: OTHER | Age: 62
End: 2017-09-25

## 2017-10-25 ENCOUNTER — ALLSCRIPTS OFFICE VISIT (OUTPATIENT)
Dept: OTHER | Facility: OTHER | Age: 62
End: 2017-10-25
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
## 2017-11-21 ENCOUNTER — ALLSCRIPTS OFFICE VISIT (OUTPATIENT)
Dept: RADIOLOGY | Facility: CLINIC | Age: 62
End: 2017-11-21
Payer: COMMERCIAL

## 2017-12-08 ENCOUNTER — GENERIC CONVERSION - ENCOUNTER (OUTPATIENT)
Dept: OTHER | Facility: OTHER | Age: 62
End: 2017-12-08

## 2018-01-10 NOTE — MISCELLANEOUS
Message     Recorded as Task   Date: 08/25/2017 05:56 PM, Created By: Noah Zamora   Task Name: Care Coordination   Assigned To: [facility],Team   Regarding Patient: Hema Solo, Status: Active   CommentCreta Shutter - 25 Aug 2017 5:56 PM     TASK CREATED  I called in lyrica 75mg po BID to patient's pharmacy  She states that she has not received a script from the office  Lyrica 75mg po BID X 30days supply called in  No refills   Sarahi Rodriguez - 25 Aug 2017 9:27 PM     TASK REPLIED TO: Previously Assigned To Sarahi Rodriguez - 28 Aug 2017 8:13 AM     TASK REPLIED TO: Previously Assigned To Osman Evans  Pt left a message with the service stating " she has been without medication all week  No one has called her back and pharmacy stated they did not receive script from the office"  Pt would like a call back [phone]  Thank You! Sarahi Rodriguez - 30 Aug 2017 8:27 PM     TASK REPLIED TO: Previously Assigned To [facility],Team                      please see task below and call in script as Koren Osgood has below  thought he called it in? Jayne Villalobos - 31 Aug 2017 9:55 AM     TASK EDITED  S/W pt who said the on call doctor did call [place] last Friday 8/25 with order for the Lyrica  Everything has been resolved  Active Problems    1  Back pain (724 5) (M54 9)   2  Brain tumor (239 6) (D49 6)   3  Cervical facet joint syndrome (723 8) (M12 88)   4  Depression with anxiety (300 4) (F41 8)   5  Diabetes mellitus with diabetic neuropathy (250 60,357 2) (E11 40)   6  Diabetes type 2, uncontrolled (250 02) (E11 65)   7  Encounter for screening mammogram for breast cancer (V76 12) (Z12 31)   8  Fatigue (780 79) (R53 83)   9  Fatty liver (571 8) (K76 0)   10  Headache (784 0) (R51)   11  Hyperlipidemia (272 4) (E78 5)   12  Hypertension (401 9) (I10)   13  Irritable bowel syndrome (564 1) (K58 9)   14   Localization-related focal epilepsy with complex partial seizures (345 40) (G40 209)   15  Low BP (458 9) (I95 9)   16  Lower back pain (724 2) (M54 5)   17  Meningioma (225 2) (D32 9)   18  Migraine with aura, not intractable, without status migrainosus (346 00) (G43 109)   19  Muscle pain, myofascial (729 1) (M79 1)   20  Neck pain (723 1) (M54 2)   21  Obesity (278 00) (E66 9)   22  Preop examination (V72 84) (Z01 818)   23  Right hip pain (719 45) (M25 551)   24  Right shoulder pain (719 41) (M25 511)   25  Screen for colon cancer (V76 51) (Z12 11)   26  Shoulder pain, right (719 41) (M25 511)   27  Sleep apnea (780 57) (G47 30)   28  Type 2 diabetes mellitus (250 00) (E11 9)    Current Meds   1  Aspirin 81 MG CAPS; take 1 capsule daily; Therapy: (Annette Santos) to Recorded   2  Atorvastatin Calcium 40 MG Oral Tablet; TAKE ONE TABLET BY MOUTH ONCE DAILY; Therapy: 24PNI4402 to (Evaluate:30Oct2017)  Requested for: 56Lmq7507; Last   Rx:68Eki6969 Ordered   3  ClonazePAM 0 5 MG Oral Tablet; 1/2 TAB BID PRN; Therapy: 72VIO3166 to Recorded   4  EpiPen 2-Parmjit 0 3 MG/0 3ML Injection Solution Auto-injector; as directed; Therapy: 94CKP3606 to [phone])  Requested for: 05FPA4469; Last   Rx:06Oct2014 Ordered   5  FLUoxetine HCl - 40 MG Oral Capsule; TAKE 1 CAPSULE DAILY; Therapy: 73Twm5898 to Recorded   6  Insulin Syringe 29G X 1" 0 3 ML Miscellaneous; use 4 times per day as instructed; Therapy: 70Pfi6734 to (Evaluate:92Xrs6190); Last Rx:17Nov2015 Ordered   7  Levemir FlexTouch 100 UNIT/ML Subcutaneous Solution Pen-injector; use 30u qhs; Last   KM:26NEO4560 Ordered   8  Lisinopril 20 MG Oral Tablet; TAKE 1 TABLET DAILY; Therapy: 67Zeg5063 to (Evaluate:82Uht0395)  Requested for: 80Dep8815; Last   Rx:68Xri5793 Ordered   9  Lyrica 75 MG Oral Capsule; Take 1 capsule twice daily; Therapy: 73DTU4940 to (Evaluate:94Lfx7362); Last Rx:22Jun2017 Ordered   10  NovoLIN R 100 UNIT/ML Injection Solution; INJECT 20 UNIT Before meals;  Last XI:99YGJ8863 Ordered   11  PriLOSEC 20 MG CPDR (Omeprazole); TAKE 1 CAPSULE DAILY; Therapy: 77Ced9646 to Recorded   12  Psyllium CAPS; TAKE 1 MG Twice daily; Therapy: (Recorded:10Nov2016) to Recorded   13  ReliOn Lancets Ultra-Thin 30G Miscellaneous; Check blood sugars daily and as needed; Therapy: 00Ped2485 to (Evaluate:14May2016); Last Rx:17Nov2015 Ordered   14  ReliOn Lancing Device KIT; USE AS DIRECTED; Therapy: 50FNJ0364 to (Evaluate:09Mar2016); Last Rx:92Afi2283 Ordered   15  ReliOn Prime Monitor Device; USE AS DIRECTED; Therapy: 46ONQ1373 to (Evaluate:09Mar2016); Last Rx:74Yjt9199 Ordered   16  ReliOn Prime Test In [employer]; USE 1 STRIP 4 times daily; Therapy: 34Ejk9159 to (Evaluate:14May2016); Last Rx:17Nov2015 Ordered   17  TiZANidine HCl - 4 MG Oral Tablet; TAKE 1 TABLET IN THE AM, 1 TAB IN THE    AFTERNOON AND 2 TABS AT NIGHT PRN SPASMS; Therapy: 17GXM1772 to (Evaluate:63Gzw9705)  Requested for: 38Gam9915; Last    Rx:14Apr2017 Ordered   18  Topiramate 100 MG Oral Tablet; TAKE 1 TABLET TWICE DAILY; Therapy: 02SZS1746 to (Evaluate:23Ihj2754)  Requested for: 11Svw5188; Last    Rx:89Vak7383 Ordered    Allergies    1  SEROquel TABS   2  Augmentin TABS    3   Food    Signatures   Electronically signed by : Luther Quick, ; Aug 31 2017  9:55AM EST                       (Author)

## 2018-01-10 NOTE — MISCELLANEOUS
To whom it may concern,   Selene Castillo is under my professional care  She is a diabetic patient and she should bring her diabetic supply with her on the plane  If you have any questions, please let me know  Sincerely,     Adonay Love MD      Electronically signed by: Ricardo Rodriguez MD  Jul 15 2016  4:25PM EST Author

## 2018-01-11 NOTE — RESULT NOTES
Verified Results  (1) VALPROIC ACID (DEPAKOTE) 17Aug2016 08:13AM Michelle Wall Order Number: [id number]     Test Name Result Flag Reference   VALPROIC ACID 60 ug/mL     - Patient Instructions: This is a non fasting blood test  Please drink two glasses of water the morning of test      (1) HEPATIC FUNCTION PANEL 17Aug2016 08:13AM Michelle Wall Order Number: [id number]     Test Name Result Flag Reference   ALBUMIN 3 4 g/dL L 3 5-5 0   - Patient Instructions: This is a non fasting blood test  Please drink two glasses of water the morning of test     - Patient Instructions:  This is a non fasting blood test  Please drink two glasses of water the morning of test    ALK PHOSPHATAS 70 U/L     ALT (SGPT) 17 U/L  12-78   AST(SGOT) 10 U/L  5-45   BILI, DIRECT 0 11 mg/dL  0 00-0 20   BILI, TOTAL 0 33 mg/dL  0 20-1 00   TOTAL PROTEIN 6 6 g/dL  6 4-8 2

## 2018-01-11 NOTE — RESULT NOTES
Verified Results  * XR KNEE 3 VW LEFT NON INJURY 10Hgz1480 01:43PM Jeff Rawls    Order Number: [id number]     Test Name Result Flag Reference   XR KNEE 3 VW LEFT (Report)     LEFT KNEE     INDICATION: Left knee pain     COMPARISON: 11/29/2010     VIEWS: 3     IMAGES: 3     FINDINGS:     Total knee arthroplasty appears in satisfactory position  No evidence of hardware failure  There is no acute fracture or dislocation  There is a small joint effusion  No lytic or blastic lesions are seen  Soft tissues are unremarkable  IMPRESSION:     Unremarkable appearance of total knee arthroplasty  No acute osseous abnormality  Small effusion            Workstation performed: [id number]     Signed by:   Saeid Marcelino MD   9/24/17

## 2018-01-12 VITALS
HEIGHT: 64 IN | BODY MASS INDEX: 34.49 KG/M2 | SYSTOLIC BLOOD PRESSURE: 124 MMHG | DIASTOLIC BLOOD PRESSURE: 78 MMHG | HEART RATE: 78 BPM | TEMPERATURE: 98.8 F | WEIGHT: 202 LBS

## 2018-01-12 VITALS
DIASTOLIC BLOOD PRESSURE: 64 MMHG | HEART RATE: 89 BPM | RESPIRATION RATE: 16 BRPM | SYSTOLIC BLOOD PRESSURE: 132 MMHG | OXYGEN SATURATION: 99 % | BODY MASS INDEX: 36.13 KG/M2 | WEIGHT: 210.5 LBS

## 2018-01-12 VITALS
WEIGHT: 199.38 LBS | SYSTOLIC BLOOD PRESSURE: 132 MMHG | BODY MASS INDEX: 34.22 KG/M2 | DIASTOLIC BLOOD PRESSURE: 74 MMHG | HEART RATE: 79 BPM | OXYGEN SATURATION: 99 %

## 2018-01-12 NOTE — MISCELLANEOUS
Message   Recorded as Task   Date: 04/25/2017 11:02 AM, Created By: Galo Bhagat   Task Name: Call Back   Assigned To: [facility],Team   Regarding Patient: Herrera Valdes, Status: Active   Comment:    Becca Eden - 25 Apr 2017 11:02 AM     TASK CREATED  [address]: patient called requesting to leave a message for office  Stated the prednisone worked fine and alleviated a lot of the pain but the pain returned 1 1/2 days after med regimen was completed  Stated the pain is back where it was and feels like the inflammation has returned  stated to please give a c/b [phone]  Bianca Kennedy - 25 Apr 2017 11:45 AM     TASK EDITED  S/w the pt  to clarify meds and she stated she is only taking the Tizanidine TID  FQ to advise  olayinka Sutherland - 25 Apr 2017 12:03 PM     TASK REPLIED TO: Previously Assigned To Israel Sutherland  she needs a NIMA   is she willing to schedule now? Shanae Wilhelm - 25 Apr 2017 1:05 PM     TASK IN PROGRESS   Shanae Wilhelm - 25 Apr 2017 1:09 PM     TASK EDITED  Spoke to pt & scheduled NIMA  pt is taking aspirin 81mg daily which is not prescribed by a doctor  Pt instructed to start holding asa on 4/29,bring a ,npo 1 hour prior to injection,if she becomes ill/fever/abt to call the office  pt verbalized understanding  Israel Sutherland - 25 Apr 2017 1:52 PM     TASK REPLIED TO: Previously Assigned To Israel Sutherland md aware        Active Problems    1  Back pain (724 5) (M54 9)   2  Brain tumor (239 6) (D49 6)   3  Depression with anxiety (300 4) (F41 8)   4  Diabetes type 2, uncontrolled (250 02) (E11 65)   5  Encounter for screening mammogram for breast cancer (V76 12) (Z12 31)   6  Fatty liver (571 8) (K76 0)   7  Headache (784 0) (R51)   8  Hyperlipidemia (272 4) (E78 5)   9  Hypertension (401 9) (I10)   10  Irritable bowel syndrome (564 1) (K58 9)   11  Localization-related focal epilepsy with complex partial seizures (345 40) (G40 209)   12  Lower back pain (724 2) (M54 5)   13  Meningioma (225 2) (D32 9)   14  Migraine with aura, not intractable, without status migrainosus (346 00) (G43 109)   15  Muscle pain, myofascial (729 1) (M79 1)   16  Neck pain (723 1) (M54 2)   17  Obesity (278 00) (E66 9)   18  Preop examination (V72 84) (Z01 818)   19  Right hip pain (719 45) (M25 551)   20  Right shoulder pain (719 41) (M25 511)   21  Shoulder pain, right (719 41) (M25 511)   22  Sleep apnea (780 57) (G47 30)   23  Type 2 diabetes mellitus (250 00) (E11 9)    Current Meds   1  Aspirin 81 MG CAPS; take 1 capsule daily; Therapy: (Recorded:10Nov2016) to Recorded   2  Atorvastatin Calcium 40 MG Oral Tablet; TAKE ONE TABLET BY MOUTH ONCE DAILY; Therapy: 64NLK8839 to (Evaluate:15Mar2017)  Requested for: 39IDU0326; Last   Rx:28Jzv1346 Ordered   3  ClonazePAM 0 5 MG Oral Tablet; 1/2 TAB BID PRN; Therapy: 31RTR0290 to Recorded   4  EpiPen 2-Parmjit 0 3 MG/0 3ML Injection Solution Auto-injector; as directed; Therapy: 52WHO7964 to (Lia Allen)  Requested for: 90ROT7810; Last   Rx:99Nni6218 Ordered   5  FLUoxetine HCl - 40 MG Oral Capsule; TAKE 1 CAPSULE DAILY; Therapy: 81Djp1955 to Recorded   6  Insulin Syringe 29G X 1" 0 3 ML Miscellaneous; use 4 times per day as instructed; Therapy: 99Wbz2821 to (Evaluate:24Mxn6681); Last Rx:17Nov2015 Ordered   7  Levemir FlexPen 100 UNIT/ML SOLN; INJECT 39 UNIT Bedtime; Therapy: (Recorded:10Nov2016) to Recorded   8  Lisinopril-Hydrochlorothiazide 20-25 MG Oral Tablet; take 1 tablet by mouth once daily; Therapy: 19Hnt2252 to (Evaluate:16Jun2017)  Requested for: 75WTC1360; Last   Rx:18Mar2017 Ordered   9  MethylPREDNISolone 4 MG Oral Tablet Therapy Pack (Medrol); TAKE AS PRESRIBED; Therapy: 79Glg2683 to (Evaluate:20Apr2017)  Requested for: 14Apr2017; Last   Rx:14Apr2017 Ordered   10  NovoLIN R SOLN; INJECT 18 UNIT Before meals; Therapy: (Recorded:10Nov2016) to Recorded   11   PriLOSEC 20 MG CPDR (Omeprazole); TAKE 1 CAPSULE DAILY; Therapy: 01Xeq3903 to Recorded   12  Psyllium CAPS; TAKE 1 MG Twice daily; Therapy: (Recorded:10Nov2016) to Recorded   13  ReliOn Lancets Ultra-Thin 30G Miscellaneous; Check blood sugars daily and as needed; Therapy: 50Fyu9921 to (Evaluate:66Ybx9838); Last Rx:17Nov2015 Ordered   14  ReliOn Lancing Device KIT; USE AS DIRECTED; Therapy: 64LYO3582 to (Evaluate:09Mar2016); Last Rx:11Sep2015 Ordered   15  ReliOn Prime Monitor Device; USE AS DIRECTED; Therapy: 46FYS0279 to (Evaluate:09Mar2016); Last Rx:33Cww6885 Ordered   16  ReliOn Prime Test In [employer]; USE 1 STRIP 4 times daily; Therapy: 11Sep2015 to (Evaluate:14May2016); Last Rx:17Nov2015 Ordered   17  TiZANidine HCl - 4 MG Oral Tablet; TAKE 1 TABLET IN THE AM, 1 TAB IN THE    AFTERNOON AND 2 TABS AT NIGHT PRN SPASMS; Therapy: 66PBV4423 to (Evaluate:12Wbq4504)  Requested for: 14Apr2017; Last    Rx:14Apr2017 Ordered   18  Topiramate 100 MG Oral Tablet; TAKE 1 TABLET TWICE DAILY; Therapy: 56PYG5817 to (Evaluate:73Jqx5818)  Requested for: 20Jan2017; Last    Rx:24Yds3634 Ordered    Allergies    1  SEROquel TABS   2  Augmentin TABS    3   Food    Signatures   Electronically signed by : Jackie Mcdonald RN; Apr 25 2017  1:56PM EST                       (Author)
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
## 2018-01-12 NOTE — RESULT NOTES
Message   right shoulder xray showed mild degenerative change, no acute fracture or dislocation  Please ask pt to follow up with orthopedics as discussed in [address]  Verified Results  * XR SHOULDER 2+ VIEW RIGHT 09GLD5299 12:42PM Mega Miller     Test Name Result Flag Reference   XR SHOULDER 2+ VW RIGHT (Report)     RIGHT SHOULDER     INDICATION: Right shoulder pain and decreased range of motion for greater than one year     COMPARISON: 9/24/2015     VIEWS: 3; 3 images     FINDINGS:     There is no acute fracture or dislocation  There is stable mild degenerative change at the acromioclavicular joint with inferior spurring off the clavicle  There is slight spurring and sclerosis along the inferior aspect of the glenohumeral joint  No lytic or blastic lesions are seen  Soft tissues are unremarkable  IMPRESSION:     Mild degenerative changes as described above         Workstation performed: [id number]     Signed by:   Alex Regalado MD   2/15/16

## 2018-01-13 VITALS
BODY MASS INDEX: 34.55 KG/M2 | HEART RATE: 76 BPM | SYSTOLIC BLOOD PRESSURE: 124 MMHG | RESPIRATION RATE: 16 BRPM | DIASTOLIC BLOOD PRESSURE: 80 MMHG | TEMPERATURE: 98.5 F | HEIGHT: 64 IN | WEIGHT: 202.38 LBS

## 2018-01-13 VITALS
HEIGHT: 64 IN | DIASTOLIC BLOOD PRESSURE: 76 MMHG | BODY MASS INDEX: 36.37 KG/M2 | WEIGHT: 213 LBS | TEMPERATURE: 99.5 F | SYSTOLIC BLOOD PRESSURE: 122 MMHG | RESPIRATION RATE: 16 BRPM | HEART RATE: 60 BPM

## 2018-01-13 VITALS
RESPIRATION RATE: 16 BRPM | SYSTOLIC BLOOD PRESSURE: 140 MMHG | WEIGHT: 220 LBS | HEIGHT: 64 IN | DIASTOLIC BLOOD PRESSURE: 88 MMHG | OXYGEN SATURATION: 99 % | TEMPERATURE: 97.8 F | BODY MASS INDEX: 37.56 KG/M2 | HEART RATE: 70 BPM

## 2018-01-13 VITALS
HEART RATE: 82 BPM | HEIGHT: 64 IN | RESPIRATION RATE: 16 BRPM | SYSTOLIC BLOOD PRESSURE: 134 MMHG | DIASTOLIC BLOOD PRESSURE: 78 MMHG | BODY MASS INDEX: 35 KG/M2 | WEIGHT: 205 LBS | TEMPERATURE: 98.4 F

## 2018-01-13 NOTE — RESULT NOTES
Verified Results  (1) VALPROIC ACID, FREE [id number] 08:13AM Loli Bias Order Number: [id number]     Test Name Result Flag Reference   VALPROIC ACID, FREE 15 2 ug/mL  6 0 - 22 0   Detection Limit = 0 5    Performed at:  [address]  [id number]  : Lynda Vaughn MD, Phone:  [phone]
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
## 2018-01-14 VITALS
SYSTOLIC BLOOD PRESSURE: 126 MMHG | HEIGHT: 64 IN | RESPIRATION RATE: 16 BRPM | HEART RATE: 76 BPM | WEIGHT: 200 LBS | DIASTOLIC BLOOD PRESSURE: 78 MMHG | BODY MASS INDEX: 34.15 KG/M2

## 2018-01-14 VITALS
RESPIRATION RATE: 16 BRPM | WEIGHT: 200 LBS | TEMPERATURE: 98.1 F | DIASTOLIC BLOOD PRESSURE: 78 MMHG | SYSTOLIC BLOOD PRESSURE: 114 MMHG | HEIGHT: 64 IN | HEART RATE: 72 BPM | BODY MASS INDEX: 34.15 KG/M2

## 2018-01-14 NOTE — MISCELLANEOUS
Message   Recorded as Task   Date: 09/08/2017 09:39 AM, Created By: Juan Antonio Barksdale   Task Name: Care Coordination   Assigned To: SPA richard clinical,Team   Regarding Patient: Frantz Foreman, Status: In Progress   Comment:    Tej Newman - 08 Sep 2017 9:39 AM     TASK CREATED  S/P R C3-C6 RFA on 09/07/17 by FQ  Next SOVS on 10/25/17 to arrive at 7:30 am w/ FQ for 6 WK F/U W/QUESHI AFTER INJECTION  S/W pt  Pt stated that needle sites look good, no s/s of infection, is sore in her back and neck, is having spasm and it "seems to be locked up", area is "rock hard", has a "major headache since last night"  Pt took Lyrica this morning, applied ice 20 minutes on and 20 minutes off last night  Advised she can use moist heat today and it takes 4-6 weeks to see the full effect  Denies fever, no sun-burning like sensation noted  Confirmed f/u appointment on 10/25/17 to arrive at 7:30 am  Pt states that she does not take any OTC meds unless the are prescribed by the physician  Pt asking if something can be prescribed for pain  Pharmacy on file  Please advise  Thanks  Aleksandar Stout 32 - 08 Sep 2017 10:02 AM     TASK REPLIED TO: Previously Assigned To SPA richard clinical,Team  will e-rx diclofenax 75mg BID to her pharmacy   Jayne Villalobos - 08 Sep 2017 10:33 AM     TASK IN PROGRESS   Jayne Villalobos - 08 Sep 2017 10:43 AM     TASK EDITED  Pt advised about the Diclofenac 75mg BID after a meal, do not take any other NSAIDS while taking the diclofenac  Pt verbalized understanding  Active Problems    1  Back pain (724 5) (M54 9)   2  Brain tumor (239 6) (D49 6)   3  Cervical facet joint syndrome (723 8) (M12 88)   4  Depression with anxiety (300 4) (F41 8)   5  Diabetes mellitus with diabetic neuropathy (250 60,357 2) (E11 40)   6  Diabetes type 2, uncontrolled (250 02) (E11 65)   7  Encounter for screening mammogram for breast cancer (V76 12) (Z12 31)   8  Fatigue (780 79) (R53 83)   9   Fatty liver (571  8) (K76 0)   10  Headache (784 0) (R51)   11  Hyperlipidemia (272 4) (E78 5)   12  Hypertension (401 9) (I10)   13  Irritable bowel syndrome (564 1) (K58 9)   14  Localization-related focal epilepsy with complex partial seizures (345 40) (G40 209)   15  Low BP (458 9) (I95 9)   16  Lower back pain (724 2) (M54 5)   17  Meningioma (225 2) (D32 9)   18  Migraine with aura, not intractable, without status migrainosus (346 00) (G43 109)   19  Muscle pain, myofascial (729 1) (M79 1)   20  Neck pain (723 1) (M54 2)   21  Obesity (278 00) (E66 9)   22  Preop examination (V72 84) (Z01 818)   23  Right hip pain (719 45) (M25 551)   24  Right shoulder pain (719 41) (M25 511)   25  Screen for colon cancer (V76 51) (Z12 11)   26  Shoulder pain, right (719 41) (M25 511)   27  Sleep apnea (780 57) (G47 30)   28  Type 2 diabetes mellitus (250 00) (E11 9)    Current Meds   1  Aspirin 81 MG CAPS; take 1 capsule daily; Therapy: (Emerita Varghese) to Recorded   2  Atorvastatin Calcium 40 MG Oral Tablet; TAKE ONE TABLET BY MOUTH ONCE DAILY; Therapy: 86LGI5032 to (Evaluate:30Oct2017)  Requested for: 36Ecb4852; Last   Rx:58Wbs5837 Ordered   3  ClonazePAM 0 5 MG Oral Tablet; 1/2 TAB BID PRN; Therapy: 35RYU2676 to Recorded   4  Diclofenac Sodium 75 MG Oral Tablet Delayed Release; TAKE 1 TABLET 2 TIMES DAILY   AFTER MEALS; Therapy: 08BEH3067 to (Malcolm Deleon)  Requested for: 25Sze8439; Last   Rx:74Xrm9695 Ordered   5  EpiPen 2-Parmjit 0 3 MG/0 3ML Injection Solution Auto-injector; as directed; Therapy: 34MIQ4112 to [phone])  Requested for: 77ASS3283; Last   Rx:41Obl5457 Ordered   6  FLUoxetine HCl - 40 MG Oral Capsule; TAKE 1 CAPSULE DAILY; Therapy: 67Kjt6690 to Recorded   7  Insulin Syringe 29G X 1" 0 3 ML Miscellaneous; use 4 times per day as instructed; Therapy: 11Sep2015 to (Evaluate:74Vex3854); Last Rx:17Nov2015 Ordered   8   Levemir FlexTouch 100 UNIT/ML Subcutaneous Solution Pen-injector; use 30u qhs; Last   IVETTE:98ZBG7251 Ordered   9  Lisinopril 20 MG Oral Tablet; TAKE 1 TABLET DAILY; Therapy: 16Bgr9124 to (Evaluate:40Ycs1981)  Requested for: 82Joh0002; Last   Rx:56Soo7601 Ordered   10  Lyrica 75 MG Oral Capsule; Take 1 capsule twice daily; Therapy: 05QZO7568 to (Evaluate:43Cvc1654); Last Rx:22Jun2017 Ordered   11  NovoLIN R 100 UNIT/ML Injection Solution; INJECT 20 UNIT Before meals; Last    LI:12ZXK7399 Ordered   12  PriLOSEC 20 MG CPDR (Omeprazole); TAKE 1 CAPSULE DAILY; Therapy: 31Hdj2393 to Recorded   13  Psyllium CAPS; TAKE 1 MG Twice daily; Therapy: (Recorded:10Nov2016) to Recorded   14  ReliOn Lancets Ultra-Thin 30G Miscellaneous; Check blood sugars daily and as needed; Therapy: 59Eou3674 to (Evaluate:38Zqy0315); Last Rx:17Nov2015 Ordered   15  ReliOn Lancing Device KIT; USE AS DIRECTED; Therapy: 67SUB1112 to (Evaluate:09Mar2016); Last Rx:38Ezh7603 Ordered   16  ReliOn Prime Monitor Device; USE AS DIRECTED; Therapy: 57ANH7822 to (Evaluate:09Mar2016); Last Rx:25Akn2413 Ordered   17  ReliOn Prime Test In [employer]; USE 1 STRIP 4 times daily; Therapy: 18Uys8085 to (Evaluate:88Fso4494); Last Rx:17Nov2015 Ordered   18  TiZANidine HCl - 4 MG Oral Tablet; TAKE 1 TABLET IN THE AM, 1 TAB IN THE    AFTERNOON AND 2 TABS AT NIGHT PRN SPASMS; Therapy: 58APU4988 to (Evaluate:19Ipt7460)  Requested for: 17Ysd3173; Last    Rx:81Xjn5094 Ordered   19  Topiramate 100 MG Oral Tablet; TAKE 1 TABLET TWICE DAILY; Therapy: 25SXK1948 to (Evaluate:43Dvj8848)  Requested for: 65Far7577; Last    Rx:43Ucb6654 Ordered    Allergies    1  SEROquel TABS   2  Augmentin TABS    3   Food    Signatures   Electronically signed by : Ana Berg, ; Sep  8 2017 10:44AM EST                       (Author)

## 2018-01-15 NOTE — RESULT NOTES
Message   labs stable  But pt needs a follow up  Last time seen Dec by Dr Faustino Naidu and she had said in her note RTO in 4 months nothing is scheduled  thanks     Verified Results  (1) CBC/PLT/DIFF 10XHE7401 07:38AM Primary Data [place]     Test Name Result Flag Reference   WBC COUNT 6 37 Thousand/uL  4 31-10 16   RBC COUNT 4 39 Million/uL  3 81-5 12   HEMOGLOBIN 13 2 g/dL  11 5-15 4   HEMATOCRIT 38 9 %  34 8-46  1   MCV 89 fL  82-98   MCH 30 1 pg  26 8-34 3   MCHC 33 9 g/dL  31 4-37 4   RDW 14 0 %  11 6-15 1   MPV 11 7 fL  8 9-12 7   PLATELET COUNT 704 Thousands/uL  149-390   nRBC AUTOMATED 0 /100 WBCs     NEUTROPHILS RELATIVE PERCENT 46 %  43-75   LYMPHOCYTES RELATIVE PERCENT 36 %  14-44   MONOCYTES RELATIVE PERCENT 11 %  4-12   EOSINOPHILS RELATIVE PERCENT 6 %  0-6   BASOPHILS RELATIVE PERCENT 1 %  0-1   NEUTROPHILS ABSOLUTE COUNT 2 88 Thousands/?L  1 85-7 62   LYMPHOCYTES ABSOLUTE COUNT 2 32 Thousands/?L  0 60-4 47   MONOCYTES ABSOLUTE COUNT 0 69 Thousand/?L  0 17-1 22   EOSINOPHILS ABSOLUTE COUNT 0 39 Thousand/?L  0 00-0 61   BASOPHILS ABSOLUTE COUNT 0 06 Thousands/?L  0 00-0 10     (1) COMPREHENSIVE METABOLIC PANEL 01KLJ4918 13:34EO [employer]     Test Name Result Flag Reference   GLUCOSE,RANDM 142 mg/dL H    If the patient is fasting, the ADA then defines impaired fasting glucose as > 100 mg/dL and diabetes as > or equal to 123 mg/dL     SODIUM 140 mmol/L  136-145   POTASSIUM 3 4 mmol/L L 3 5-5 3   CHLORIDE 102 mmol/L  100-108   CARBON DIOXIDE 30 mmol/L  21-32   ANION GAP (CALC) 8 mmol/L  4-13   BLOOD UREA NITROGEN 14 mg/dL  5-25   CREATININE 0 80 mg/dL  0 60-1 30   Standardized to IDMS reference method   CALCIUM 8 6 mg/dL  8 3-10 1   BILI, TOTAL 0 59 mg/dL  0 20-1 00   ALK PHOSPHATAS 81 U/L     ALT (SGPT) 26 U/L  12-78   AST(SGOT) 21 U/L  5-45   ALBUMIN 3 3 g/dL L 3 5-5 0   TOTAL PROTEIN 7 1 g/dL  6 4-8 2   eGFR Non-African American      >60 0 ml/min/1 73sq [employer] Disease Education Program recommendations are as follows:  GFR calculation is accurate only with a steady state creatinine  Chronic Kidney disease less than 60 ml/min/1 73 sq  meters  Kidney failure less than 15 ml/min/1 73 sq  meters  (1) TSH 02PKA9728 07:38AM Jeff Rawls     Test Name Result Flag Reference   TSH 1 770 uIU/mL  0 358-3 740   Patients undergoing fluorescein dye angiography may retain small amounts of fluorescein in the body for 48-72 hours post procedure  Samples containing fluorescein can produce falsely depressed TSH values  If the patient had this procedure,a specimen should be resubmitted post fluorescein clearance  The recommended reference ranges for TSH during pregnancy are as follows:  First trimester 0 1 to 2 5 uIU/mL  Second trimester  0 2 to 3 0 uIU/mL  Third trimester 0 3 to 3 0 uIU/m     (1) LIPID PANEL FASTING W DIRECT LDL REFLEX 93HNY9358 07:38AM Jeff Rawls     Test Name Result Flag Reference   CHOLESTEROL 131 mg/dL     LDL CHOLESTEROL CALCULATED 64 mg/dL  0-100   Triglyceride:         Normal              <150 mg/dl       Borderline High    150-199 mg/dl       High               200-499 mg/dl       Very High          >499 mg/dl  Cholesterol:         Desirable        <200 mg/dl      Borderline High  200-239 mg/dl      High             >239 mg/dl  HDL Cholesterol:        High    >59 mg/dL      Low     <41 mg/dL  LDL Cholesterol:        Optimal          <100 mg/dl        Near Optimal     100-129 mg/dl        Above Optimal          Borderline High   130-159 mg/dl          High              160-189 mg/dl          Very High        >189 mg/dl  LDL CALCULATED:    This screening LDL is a calculated result  It does not have the accuracy of the Direct Measured LDL in the monitoring of patients with hyperlipidemia and/or statin therapy  Direct Measure LDL (CCC303) must be ordered separately in these patients     TRIGLYCERIDES 135 mg/dL  <=150   Specimen collection should occur prior to N-Acetylcysteine or Metamizole administration due to the potential for falsely depressed results  HDL,DIRECT 40 mg/dL  40-60   Specimen collection should occur prior to Metamizole administration due to the potential for falsely depressed results  (1) HEMOGLOBIN A1C 33DJW1558 07:38AM [place] [facility]     Test Name Result Flag Reference   HEMOGLOBIN A1C 6 7 % H 4 2-6 3   EST  AVG   GLUCOSE 146 mg/dl         Signatures   Electronically signed by : Lawson Cogan, M D ; Jul 26 2016  4:36PM EST                       (Author)

## 2018-01-15 NOTE — RESULT NOTES
Verified Results  (1) CBC/PLT/DIFF 25Aug2017 04:19PM Jike Xueyuan   TW Order Number: [id number]     Test Name Result Flag Reference   WBC COUNT 7 65 Thousand/uL  4 31-10 16   RBC COUNT 4 47 Million/uL  3 81-5 12   HEMOGLOBIN 13 3 g/dL  11 5-15 4   HEMATOCRIT 40 8 %  34 8-46  1   MCV 91 fL  82-98   MCH 29 8 pg  26 8-34 3   MCHC 32 6 g/dL  31 4-37 4   RDW 13 2 %  11 6-15 1   MPV 12 0 fL  8 9-12 7   PLATELET COUNT 684 Thousands/uL  149-390   nRBC AUTOMATED 0 /100 WBCs     NEUTROPHILS RELATIVE PERCENT 58 %  43-75   LYMPHOCYTES RELATIVE PERCENT 28 %  14-44   MONOCYTES RELATIVE PERCENT 9 %  4-12   EOSINOPHILS RELATIVE PERCENT 3 %  0-6   BASOPHILS RELATIVE PERCENT 2 % H 0-1   NEUTROPHILS ABSOLUTE COUNT 4 44 Thousands/? ??L  1 85-7 62   LYMPHOCYTES ABSOLUTE COUNT 2 12 Thousands/? ??L  0 60-4 47   MONOCYTES ABSOLUTE COUNT 0 69 Thousand/? ??L  0 17-1 22   EOSINOPHILS ABSOLUTE COUNT 0 26 Thousand/? ??L  0 00-0 61   BASOPHILS ABSOLUTE COUNT 0 12 Thousands/? ??L H 0 00-0 10     (1) TSH WITH FT4 REFLEX 25Aug2017 04:19PM Jike Xueyuan   TW Order Number: [id number]     Test Name Result Flag Reference   TSH 1 070 uIU/mL  0 358-3 740   Patients undergoing fluorescein dye angiography may retain small amounts of fluorescein in the body for 48-72 hours post procedure  Samples containing fluorescein can produce falsely depressed TSH values  If the patient had this procedure,a specimen should be resubmitted post fluorescein clearance            The recommended reference ranges for TSH during pregnancy are as follows:  First trimester 0 1 to 2 5 uIU/mL  Second trimester  0 2 to 3 0 uIU/mL  Third trimester 0 3 to 3 0 uIU/m     (1) COMPREHENSIVE METABOLIC PANEL 03HJI7564 76:91OF Jike Xueyuan   TW Order Number: [id number]     Test Name Result Flag Reference   GLUCOSE,RANDM 113 mg/dL     If the patient is fasting, the ADA then defines impaired fasting glucose as > 100 mg/dL and diabetes as > or equal to 123 mg/dL  Specimen collection should occur prior to Sulfasalazine administration due to the potential for falsely depressed results  Specimen collection should occur prior to Sulfapyridine administration due to the potential for falsely elevated results  SODIUM 138 mmol/L  136-145   POTASSIUM 4 0 mmol/L  3 5-5 3   CHLORIDE 106 mmol/L  100-108   CARBON DIOXIDE 25 mmol/L  21-32   ANION GAP (CALC) 7 mmol/L  4-13   BLOOD UREA NITROGEN 12 mg/dL  5-25   CREATININE 0 87 mg/dL  0 60-1 30   Standardized to IDMS reference method   CALCIUM 8 4 mg/dL  8 3-10 1   BILI, TOTAL 0 51 mg/dL  0 20-1 00   ALK PHOSPHATAS 85 U/L     ALT (SGPT) 18 U/L  12-78   Specimen collection should occur prior to Sulfasalazine and/or Sulfapyridine administration due to the potential for falsely depressed results  AST(SGOT) 28 U/L  5-45   Specimen collection should occur prior to Sulfasalazine administration due to the potential for falsely depressed results  ALBUMIN 3 6 g/dL  3 5-5 0   TOTAL PROTEIN 7 7 g/dL  6 4-8 2   eGFR 71 ml/min/1 73sq m     National Kidney Disease Education Program recommendations are as follows:  GFR calculation is accurate only with a steady state creatinine  Chronic Kidney disease less than 60 ml/min/1 73 sq  meters  Kidney failure less than 15 ml/min/1 73 sq  meters

## 2018-01-16 NOTE — MISCELLANEOUS
Message     Recorded as Task   Date: 11/23/2016 03:00 PM, Created By: Ori Morris   Task Name: Follow Up   Assigned To: SPA richard clinical,Team   Regarding Patient: Mike Zamudio, Status: Active   Comment:    Ori Morris - 23 Nov 2016 3:00 PM     TASK CREATED  Caller: Self; General Medical Question; [phone] (Home)    ***Dr Wolfgang Aguilar pt***    Pt left vm at 1:43 that she saw Dr Wolfgang Aguilar last week and he told her to call in 1 wk on how the medication was working and if working he would tell her to start taking during the day, how much to take and how often  The medication helps but it doesn't totally take it away but eases it  S/W pt and confirmed the medication she was started on was tizanidine 4mg 1 QHS  The tizanidine was prescribed on 11/16 as 4mg 1 tab QHS, #30 with 2 refills  Told pt I would ask the covering provider today for Dr Wolfgang Aguilar to advise, our office will c/b with his rec either today or friday  Jazmin Garcia - 23 Nov 2016 5:59 PM     TASK REPLIED TO: Previously Assigned To Jazmin Garcia                      The patient may take tizanidine 4 mg 3 times a day when necessary  She is not to exceed this dose  Dr Wolfgang Aguilar was not explicit as to how often he wanted this medication administered, however this dosing is reasonable  I have resent the prescription to her pharmacy For more frequent dosing   Jayne Villalobos - 25 Nov 2016 8:10 AM     TASK REASSIGNED: Previously Assigned To Michael Briones - 25 Nov 2016 9:25 AM     TASK EDITED  Pt advised of the same  Pt verbalized understanding, pt understood she is to contact the office if experiencing any issues with this dosing  Active Problems    1  Back pain (724 5) (M54 9)   2  Brain tumor (239 6) (D49 6)   3  Depression with anxiety (300 4) (F41 8)   4  Diabetes type 2, uncontrolled (250 02) (E11 65)   5  Encounter for screening mammogram for breast cancer (V76 12) (Z12 31)   6   Fatty liver (571 8) (K76 0)   7  Headache (784 0) (R51)   8  Hyperlipidemia (272 4) (E78 5)   9  Hypertension (401 9) (I10)   10  Irritable bowel syndrome (564 1) (K58 9)   11  Localization-related focal epilepsy with complex partial seizures (345 40) (G40 209)   12  Lower back pain (724 2) (M54 5)   13  Meningioma (225 2) (D32 9)   14  Migraine with aura (346 00) (G43 109)   15  Muscle pain, myofascial (729 1) (M79 1)   16  Neck pain (723 1) (M54 2)   17  Obesity (278 00) (E66 9)   18  Preop examination (V72 84) (Z01 818)   19  Right hip pain (719 45) (M25 551)   20  Right shoulder pain (719 41) (M25 511)   21  Shoulder pain, right (719 41) (M25 511)   22  Sleep apnea (780 57) (G47 30)   23  Tremor (781 0) (R25 1)   24  Type 2 diabetes mellitus (250 00) (E11 9)    Current Meds   1  Aspirin 81 MG CAPS; take 1 capsule daily; Therapy: (Recorded:10Nov2016) to Recorded   2  Atorvastatin Calcium 40 MG Oral Tablet; TAKE ONE TABLET BY MOUTH ONCE DAILY; Therapy: 82JFM1147 to (Evaluate:15Mar2017)  Requested for: 84BFT5058; Last   Rx:76Jnf2005 Ordered   3  ClonazePAM 0 5 MG Oral Tablet; 1/2 TAB BID PRN; Therapy: 98TOV4787 to Recorded   4  Divalproex Sodium 500 MG Oral Tablet Delayed Release; Take one tablet twice daily; Therapy: 08NJS5913 to (Evaluate:30Jan2017)  Requested for: 67PGZ0768; Last   Rx:51Fvm9185 Ordered   5  EpiPen 2-Parmjit 0 3 MG/0 3ML Injection Solution Auto-injector; as directed; Therapy: 68KZD8564 to ([phone])  Requested for: 69WJI3316; Last   Rx:42Rtr9215 Ordered   6  FLUoxetine HCl - 40 MG Oral Capsule; TAKE 1 CAPSULE DAILY; Therapy: 50Jhb3311 to Recorded   7  Insulin Syringe 29G X 1" 0 3 ML Miscellaneous; use 4 times per day as instructed; Therapy: 61Xkn7954 to (Evaluate:12Igc6622); Last Rx:17Nov2015 Ordered   8  Levemir FlexPen 100 UNIT/ML SOLN; INJECT 39 UNIT Bedtime; Therapy: (Recorded:10Nov2016) to Recorded   9   Lisinopril-Hydrochlorothiazide 20-25 MG Oral Tablet; TAKE ONE TABLET BY MOUTH   ONCE DAILY; Therapy: 06Usu3616 to (Evaluate:15Mar2017)  Requested for: 27QUG4707; Last   Rx:76Zxs9705 Ordered   10  NovoLIN R SOLN; INJECT 18 UNIT Before meals; Therapy: (Recorded:10Nov2016) to Recorded   11  PriLOSEC 20 MG Oral Capsule Delayed Release (Omeprazole); TAKE 1 CAPSULE    DAILY; Therapy: 35Mho6431 to Recorded   12  Psyllium CAPS; TAKE 1 MG Twice daily; Therapy: (Recorded:10Nov2016) to Recorded   13  ReliOn Lancets Ultra-Thin 30G Miscellaneous; Check blood sugars daily and as needed; Therapy: 11Sep2015 to (Evaluate:14May2016); Last Rx:17Nov2015 Ordered   14  ReliOn Lancing Device KIT; USE AS DIRECTED; Therapy: 26SYG1583 to (Evaluate:09Mar2016); Last Rx:11Sep2015 Ordered   15  ReliOn Prime Monitor Device; USE AS DIRECTED; Therapy: 17XJQ8710 to (Evaluate:09Mar2016); Last Rx:11Sep2015 Ordered   16  ReliOn Prime Test In [employer]; USE 1 STRIP 4 times daily; Therapy: 57Ldh9434 to (Evaluate:14May2016); Last Rx:17Nov2015 Ordered   17  TiZANidine HCl - 4 MG Oral Tablet; TAKE 1 TABLET EVERY 8 HOURS AS NEEDED; Therapy: 45YAH9545 to (Evaluate:22Jan2017)  Requested for: 23Nov2016; Last    Rx:23Nov2016 Ordered   18  Topiramate 25 MG Oral Tablet; Increase as directed to goal dose of 100 mg twice a day; Therapy: 24UBY3583 to (Evaluate:37Wrd2202)  Requested for: 97MWY4565; Last    Rx:08Fho8100 Ordered    Allergies    1  SEROquel TABS   2  Augmentin TABS    3   Food    Signatures   Electronically signed by : Isis Duncan, ; Nov 25 2016  9:25AM EST                       (Author)

## 2018-01-16 NOTE — PROGRESS NOTES
Assessment    1  Brain tumor (239 6) (D49 6)   2  Meningioma (225 2) (D32 9)   3  Localization-related focal epilepsy with complex partial seizures (345 40) (G40 209)    Discussion/Summary  Discussion Summary:   Nba Owens is a 57 yo woman with left parietal meningioma s/p resection and localization related epilepsy who is returning to Epilepsy clinic for follow up  Neurologic examination was normal     1  Localization related epilepsy due to left parietal meningioma  She continues to be seizure free on Depakote monotherapy  Depakote is also likely helping her headaches, and although she does have some enhancement of her physiologic tremor, she does not feel like this is a problem or interfering with function, so she is not interested in changing to a different medication  2  Meningioma: She will continue to follow with Dr Mary Bhatti  3  Headaches: these have improved from her last visit, and currently are not a big concern for her  I discussed the risk of taking medications like Tylenol too often for headaches, which can lead to medication overuse headache  Currently, she is mainly taking it everyday for musculoskeletal joint pain predominantly, so the risk of it leading to medication overuse headache is less than if she was taking it daily for headache  She will return to clinic in about 6 months  Instructions given to patient:  -- Continue to take Depakote (Divalproex) 500 mg twice a day  -- For your headaches, try not to take a medication for your headache more than 2-3 times a week  -- You can try to take Naproxen (Aleve) instead of the Tylenol if you wish  -- Call our office if any problems come up  Chief Complaint  Chief Complaint Free Text Note Form: Follow up of seizure      History of Present Illness  HPI:   Current medications:  1  Divalproex 500 mg twice a day  Other medications as per chart         Nba Owens is a 57 yo woman with left parietal meningioma s/p resection and localization related epilepsy who is returning to Epilepsy clinic for follow up  I last saw [place] on 9/10/2015  At that time, she was doing well, only having had one seizure with led to the discovery of her meningioma  She had not had a seizure since being started on Depakote, so no changes were made  She was having bad headaches, which she was managing with oxycodone, but no changes were made due to upcoming surgery  Since her last visit, she underwent craniotomy for resection of her meningioma  For the first 1 5 weeks after surgery, she had many complaints including severe headaches, but this all gradually improved  Now, she feels quite good and denies any residual neurologic problems  She has not had a seizure since her initial presentation  She is tolerating Depakote well, but does note a tremor that is worse than it had been previously  This is intermittent and makes her writing a little shaky, but otherwise is not a problem  She has been having more frequent headaches, but these are rather mild and only about once a month do they increase in intensity to be a problem  She takes Tylenol daily, but this is mostly for orthopedic problems and not that frequently for her headaches  Review of old records: I reviewed her notes from Neurosurgery and her PCP, which are summarized and incorporated above  Review of Systems  Neurological ROS:   Constitutional: no fever, no chills, no recent weight gain, no recent weight loss, no complaints of feeling tired, no changes in appetite  HEENT: tinnitus  Cardiovascular:  no chest pain or pressure, no palpitations present, the heart rate was not rapid or irregular, no swelling in the arms or legs, no poor circulation  Respiratory:  no unusual or persistant cough, no shortness of breath with or without exertion  Gastrointestinal: diarrhea  Genitourinary: feelings of urinary urgency     Musculoskeletal: arthralgias, myalgias and head/neck/back pain  Integumentary  no masses, no rash, no skin lesions, no livedo reticularis  Psychiatric: anxiety and depression  Endocrine  no unusual weight loss or gain, no excessive urination, no excessive thirst, no hair loss or gain, no hot or cold intolerance, no menstrual period change or irregularity, no loss of sexual ability or drive, no erection difficulty, no nipple discharge  Hematologic/Lymphatic:  no unusual bleeding, no tendency for easy bruising, no clotting skin or lumps  Neurological General: headache and snoring  Neurological Mental Status: memory problems  Neurological Cranial Nerves:  no blurry or double vision, no loss of vision, no face drooping, no facial numbness or weakness, no taste or smell loss/changes, no hearing loss or ringing, no vertigo or dizziness, no dysphagia, no slurred speech  Neurological Motor findings include: tremor and twitching  Neurological Coordination:  no unsteadiness, no vertigo or dizziness, no clumsiness, no problems reaching for objects  Neurological Sensory: numbness and tingling  Neurological Gait:  no difficulty walking, not falling to one side, no sensation of being pushed, has not had falls  ROS Reviewed:   ROS reviewed  Current Meds   1  Aspirin 81 MG CAPS; Therapy: (Gavin Degroot) to Recorded   2  Atorvastatin Calcium 40 MG Oral Tablet; TAKE 1 TABLET DAILY; Therapy: 97RHF3541 to (Evaluate:09Mar2016); Last Rx:11Sep2015 Ordered   3  ClonazePAM 0 5 MG Oral Tablet; 1/2 tab tid; Therapy: 52TYV7923 to Recorded   4  Divalproex Sodium 500 MG Oral Tablet Delayed Release; Take one tablet twice daily; Therapy: 17VEP5442 to (Evaluate:11Oct2015); Last Rx:11Sep2015 Ordered   5  EpiPen 2-Parmjit 0 3 MG/0 3ML Injection Solution Auto-injector; as directed; Therapy: 24LVE3272 to (Jason Patel)  Requested for: 31PSU9146; Last   Rx:06Oct2014 Ordered   6   FLUoxetine HCl - 40 MG Oral Capsule; TAKE 1 CAPSULE DAILY; Therapy: 06Aug2013 to Recorded   7  Insulin Syringe 29G X 1" 0 3 ML Miscellaneous; use 4 times per day as instructed; Therapy: 11Sep2015 to (Evaluate:46Kdx7929); Last Rx:17Nov2015 Ordered   8  Levemir FlexPen 100 UNIT/ML SOLN; INJECT 30 UNIT Bedtime; Therapy: (Franklyn Ely) to Recorded   9  Lisinopril-Hydrochlorothiazide 20-25 MG Oral Tablet; TAKE 1 TABLET DAILY; Therapy: 16ZLE6959 to (Evaluate:09Mar2016); Last Rx:11Sep2015 Ordered   10  NovoLIN R SOLN; INJECT 15 UNIT Before meals; Therapy: (myDrugCosts Ely) to Recorded   11  Ondansetron 4 MG Oral Tablet Dispersible; TAKE 4 MG Once 30' before MRI, take    second tab as needed; Therapy: 53QOV9310 to (Last Rx:23Bvx9607)  Requested for: 28Dec2015 Ordered   12  PriLOSEC 20 MG Oral Capsule Delayed Release; Therapy: 06Aug2013 to Recorded   13  Psyllium CAPS; TAKE 1 MG Twice daily; Therapy: (Recorded:14Sep2015) to Recorded   14  ReliOn Lancets Ultra-Thin 30G Miscellaneous; Check blood sugars daily and as    needed; Therapy: 11Sep2015 to (Evaluate:64Acs5549); Last Rx:17Nov2015 Ordered   15  ReliOn Lancing Device KIT; USE AS DIRECTED; Therapy: 17WHI9731 to (Evaluate:09Mar2016); Last Rx:11Sep2015 Ordered   16  ReliOn Prime Monitor Device; USE AS DIRECTED; Therapy: 84VVW9259 to (Evaluate:09Mar2016); Last Rx:11Sep2015 Ordered   17  ReliOn Prime Test In [employer]; USE 1 STRIP 4 times daily; Therapy: 11Sep2015 to (Evaluate:81Aqs7029); Last Rx:17Nov2015 Ordered   18  Tylenol Extra Strength 500 MG Oral Tablet; 1-2 tabs po 1-2 times per day; Therapy: 22ITE7585 to Recorded  Medication List Reviewed: The medication list was reviewed and updated today  Allergies    1  SEROquel TABS   2  Augmentin TABS    3   Food    Vitals  Signs [Data Includes: Current Encounter]   Recorded: [id number] 08:06AM   Heart Rate: 86  Respiration: 18  Systolic: 449  Diastolic: 80  Height: 5 ft 4 in  Weight: 202 lb 0 96 oz  BMI Calculated: 34 68  BSA Calculated: 1 97  O2 Saturation: 98    Physical Exam   GENERAL EXAMINATION:   In general patient is well appearing and in no distress  There is no peripheral edema  NEUROLOGIC EXAMINATION:     Alert and oriented to person, date, location  Fund of knowledge is full with good understanding of medical situation  Mood and affect are appropriate  Attention is intact  Language function including fluency, naming, and comprehension intact  Cranial nerves: Pupils are equal round reactive to light and accommodation  Visual Fields are full to confrontation bilaterally  Extraocular movements are intact without nystagmus  Facial sensation is intact to light touch  No facial droop, face activates symmetrically  There is no dysarthria  Hearing was intact to conversation  Tongue and uvula are midline and palate elevates symmetrically  Shoulder shrug  5/5  Motor Exam:  Mild enhanced physiologic tremor with arms outstretched  No pronator drift  Bulk and tone are normal  Strength is 5/5 throughout  Deep tendon reflexes: Biceps 2+, triceps 2+, brachioradialis 2+, patellar 2+, Achilles 2+ bilaterally  Sensation: Intact light touch    Coordination: Finger nose finger and heel to shin testing are without dysmetria  Gait: Negative romberg  Normal casual gait  Future Appointments    Date/Time Provider Specialty Site   02/15/2016 01:50 PM LORIE Townsend  Orthopedic Surgery [facility]   04/22/2016 10:40 AM Nuno Gaytan  [address]   08/19/2016 09:00 AM LORIE De Los Santos   Neurology [facility]   03/28/2016 02:30 PM Cleo Guido MD PhD Neurosurgery [address] [facility]   Electronically signed by : LORIE Mortensen ; Feb 5 2016  8:58AM EST                       (Author)

## 2018-01-16 NOTE — MISCELLANEOUS
Message   Recorded as Task   Date: 09/25/2017 08:05 AM, Created By: Joni Do   Task Name: Miscellaneous   Assigned To: Allen Nazario clinical,Team   Regarding Patient: Osmar Hardy, Status: In Progress   Comment:    Ai Chahal - 25 Sep 2017 8:05 AM     TASK CREATED  Pt called requesting refill of Lyrica  She said she is out of it  Pt uses [facility] in [facility] (on file in [facility]),  Pt can be reached at [phone]  Tej Newman - 25 Sep 2017 9:11 AM     TASK EDITED  S/W pt regarding Lyrica refill, pt stated same as above  It looks like the patient was seen by Dr Nicolasa Gaming on September 21st, and in [employer]scripts there is a prescription for Lyrica on September 21st, by [place]  Pt stated that Dr Leora Santillan does not handle her Lyrica prescription  [facility] [place]? Please advise  Thanks  Sarahi Rodriguez - 25 Sep 2017 12:26 PM     TASK REPLIED TO: Previously Assigned To Laine Morgan  can you call in a Lyrica script for her  Lyrica 75 mg 1 tab po Q 12 hours disp # 60 one refil   Becca Eden - 25 Sep 2017 2:27 PM     TASK EDITED  [facility] Call center- Patient called inquiring if the problem w/ her Rx was fixed   c/b [phone]   Aminata Whitney - 25 Sep 2017 3:29 PM     TASK IN PROGRESS   Aminata Whitney - 25 Sep 2017 3:32 PM     TASK EDITED  called in to pharmacy   Aminata Whitney - 25 Sep 2017 3:33 PM     TASK EDITED  Pt made aware  Active Problems    1  Back pain (724 5) (M54 9)   2  Brain tumor (239 6) (D49 6)   3  Cervical facet joint syndrome (723 8) (M12 88)   4  Depression with anxiety (300 4) (F41 8)   5  Diabetes mellitus with diabetic neuropathy (250 60,357 2) (E11 40)   6  Diabetes type 2, uncontrolled (250 02) (E11 65)   7  Encounter for screening mammogram for breast cancer (V76 12) (Z12 31)   8  Fatigue (780 79) (R53 83)   9  Fatty liver (571 8) (K76 0)   10  Headache (784 0) (R51)   11  Hyperlipidemia (272 4) (E78 5)   12  Hypertension (401 9) (I10)   13  Irritable bowel syndrome (564 1) (K58 9)   14  Localization-related focal epilepsy with complex partial seizures (345 40) (G40 209)   15  Low BP (458 9) (I95 9)   16  Lower back pain (724 2) (M54 5)   17  Meningioma (225 2) (D32 9)   18  Migraine with aura, not intractable, without status migrainosus (346 00) (G43 109)   19  Muscle pain, myofascial (729 1) (M79 1)   20  Neck pain (723 1) (M54 2)   21  Obesity (278 00) (E66 9)   22  Pain in left knee (719 46) (M25 562)   23  Preop examination (V72 84) (Z01 818)   24  Right hip pain (719 45) (M25 551)   25  Right shoulder pain (719 41) (M25 511)   26  Screen for colon cancer (V76 51) (Z12 11)   27  Screen for colon cancer (V76 51) (Z12 11)   28  Shoulder pain, right (719 41) (M25 511)   29  Sleep apnea (780 57) (G47 30)   30  Type 2 diabetes mellitus (250 00) (E11 9)    Current Meds   1  Aspirin 81 MG CAPS; take 1 capsule daily; Therapy: (Kim Marquez) to Recorded   2  Atorvastatin Calcium 40 MG Oral Tablet; TAKE ONE TABLET BY MOUTH ONCE DAILY; Therapy: 28TEE3013 to (Evaluate:30Oct2017)  Requested for: 91Wpe1254; Last   Rx:51Gnt9964 Ordered   3  ClonazePAM 0 5 MG Oral Tablet; 1/2 TAB BID PRN; Therapy: 24GRT7382 to Recorded   4  Diclofenac Sodium 75 MG Oral Tablet Delayed Release; TAKE 1 TABLET 2 TIMES DAILY   AFTER MEALS; Therapy: 65YVS4365 to [phone]  Requested for: 77Itl8887; Last   Rx:97Cdv9459 Ordered   5  EpiPen 2-Parmjit 0 3 MG/0 3ML Injection Solution Auto-injector; as directed; Therapy: 29DWJ1134 to [phone])  Requested for: 51BPV7767; Last   Rx:06Oct2014 Ordered   6  FLUoxetine HCl - 40 MG Oral Capsule; TAKE 1 CAPSULE DAILY; Therapy: 72Aws6866 to Recorded   7  HydroCHLOROthiazide 25 MG Oral Tablet; Take 1 tablet daily; Therapy: 48LAQ5652 to (Evaluate:20Mar2018)  Requested for: 65CTC0293; Last   Rx:62Dwh4282 Ordered   8   Insulin Syringe 29G X 1" 0 3 ML Miscellaneous; use 4 times per day as instructed; Therapy: 45Svj6793 to (Evaluate:89Ltw7567); Last Rx:17Nov2015 Ordered   9  Levemir FlexTouch 100 UNIT/ML Subcutaneous Solution Pen-injector; use 30u qhs; Last   Rx:03May2017 Ordered   10  Lisinopril 20 MG Oral Tablet; TAKE 1 TABLET DAILY; Therapy: 05Bzm9416 to (Evaluate:17Tbx5274)  Requested for: 21Sep2017; Last    Rx:40Fdt5965 Ordered   11  Lyrica 75 MG Oral Capsule; Take 1 capsule twice daily; Therapy: 42QVK6101 to (Evaluate:43Pru3446); Last Rx:22Jun2017 Ordered   12  NovoLIN R 100 UNIT/ML Injection Solution; INJECT 20 UNIT Before meals; Last    SY:92UQE2065 Ordered   13  PriLOSEC 20 MG CPDR (Omeprazole); TAKE 1 CAPSULE DAILY; Therapy: 79Jpa7093 to Recorded   14  Psyllium CAPS; TAKE 1 MG Twice daily; Therapy: (Recorded:10Nov2016) to Recorded   15  ReliOn Lancets Ultra-Thin 30G Miscellaneous; Check blood sugars daily and as needed; Therapy: 11Sep2015 to (Evaluate:22Szk8066); Last Rx:17Nov2015 Ordered   16  ReliOn Lancing Device KIT; USE AS DIRECTED; Therapy: 98MNV6658 to (Evaluate:09Mar2016); Last Rx:11Sep2015 Ordered   17  ReliOn Prime Monitor Device; USE AS DIRECTED; Therapy: 16JHS9911 to (Evaluate:09Mar2016); Last Rx:11Sep2015 Ordered   18  ReliOn Prime Test In [employer]; USE 1 STRIP 4 times daily; Therapy: 94Jay6831 to (Evaluate:80Qrf6578); Last Rx:17Nov2015 Ordered   19  TiZANidine HCl - 4 MG Oral Tablet; TAKE 1 TABLET IN THE AM, 1 TAB IN THE    AFTERNOON AND 2 TABS AT NIGHT PRN SPASMS; Therapy: 37WCJ3648 to (Evaluate:49Tal7103)  Requested for: 92Hwu4764; Last    Rx:14Apr2017 Ordered   20  Topiramate 100 MG Oral Tablet; TAKE 1 TABLET TWICE DAILY; Therapy: 16GFQ1818 to (Evaluate:04Qps6751)  Requested for: 59Bns4561; Last    Rx:59Hmp4896 Ordered    Allergies    1  SEROquel TABS   2  Augmentin TABS    3   Food    Signatures   Electronically signed by : Candance Deed, ; Sep 25 2017  3:34PM EST                       (Author)

## 2018-01-22 VITALS
BODY MASS INDEX: 37.18 KG/M2 | TEMPERATURE: 97.3 F | HEIGHT: 64 IN | WEIGHT: 217.8 LBS | DIASTOLIC BLOOD PRESSURE: 96 MMHG | RESPIRATION RATE: 16 BRPM | SYSTOLIC BLOOD PRESSURE: 178 MMHG | HEART RATE: 68 BPM

## 2018-01-22 VITALS — SYSTOLIC BLOOD PRESSURE: 150 MMHG | DIASTOLIC BLOOD PRESSURE: 96 MMHG

## 2018-01-23 NOTE — RESULT NOTES
Message   Recorded as Task   Date: 11/28/2017 08:28 AM, Created By: ElbertOsei Jaz Greenberg Wolf   Task Name: Follow Up   Assigned To: LOWELL velazco,Team   Regarding Patient: Jack Somers, Status: Active   Comment:    Nichol Yagn - 28 Nov 2017 8:28 AM     TASK CREATED  pt S/P B/L  L2-5 MBB on 11/21/17 w/ Julee Oates at [facility]  no f/u scheduled  no pain diary scanned in      Nichol Yang - 28 Nov 2017 8:28 AM     TASK IN PROGRESS   Nichol Yang - 28 Nov 2017 10:07 AM     TASK EDITED  spoke to pt she states she received 80-90% relief from inj and current pain is 7/10   Chaya Fish - 28 Nov 2017 12:10 PM     TASK REPLIED TO: Previously Assigned To Chaya Fish md aware   will await pain diary results before proceeding with RFA   Glory Rashid - 08 Dec 5128 8:46 AM     TASK EDITED  Spoke with patient and she said she would send in pain diary today I advised patient without that we can not move forward with next step          Signatures   Electronically signed by : Anil Isabel, ; Dec  8 2017  9:48AM EST                       (Author)

## 2018-02-16 ENCOUNTER — OFFICE VISIT (OUTPATIENT)
Dept: NEUROLOGY | Facility: CLINIC | Age: 63
End: 2018-02-16
Payer: MEDICARE

## 2018-02-16 VITALS
BODY MASS INDEX: 35.42 KG/M2 | HEART RATE: 91 BPM | WEIGHT: 207.5 LBS | HEIGHT: 64 IN | SYSTOLIC BLOOD PRESSURE: 120 MMHG | DIASTOLIC BLOOD PRESSURE: 66 MMHG

## 2018-02-16 DIAGNOSIS — G40.209 LOCALIZATION-RELATED FOCAL EPILEPSY WITH COMPLEX PARTIAL SEIZURES (HCC): Primary | ICD-10-CM

## 2018-02-16 DIAGNOSIS — G43.109 MIGRAINE WITH AURA, NOT INTRACTABLE, WITHOUT STATUS MIGRAINOSUS: ICD-10-CM

## 2018-02-16 DIAGNOSIS — D32.9 MENINGIOMA (HCC): ICD-10-CM

## 2018-02-16 PROCEDURE — 99214 OFFICE O/P EST MOD 30 MIN: CPT | Performed by: PSYCHIATRY & NEUROLOGY

## 2018-02-16 RX ORDER — TOPIRAMATE 100 MG/1
1 TABLET, FILM COATED ORAL 2 TIMES DAILY
COMMUNITY
Start: 2016-08-19 | End: 2018-02-16 | Stop reason: SDUPTHER

## 2018-02-16 RX ORDER — EPINEPHRINE 0.3 MG/.3ML
INJECTION SUBCUTANEOUS
COMMUNITY
Start: 2014-10-06

## 2018-02-16 RX ORDER — TOPIRAMATE 100 MG/1
150 TABLET, FILM COATED ORAL 2 TIMES DAILY
Qty: 90 TABLET | Refills: 11 | Status: SHIPPED | OUTPATIENT
Start: 2018-02-16 | End: 2018-03-18 | Stop reason: SDUPTHER

## 2018-02-16 RX ORDER — CLONAZEPAM 0.5 MG/1
0.5 TABLET ORAL DAILY PRN
COMMUNITY
Start: 2015-10-12

## 2018-02-16 NOTE — PROGRESS NOTES
Patient ID: Marcin Alvarenga is a 57 y o  female with left parietal meningioma s/p resection and localization related epilepsy, who is returning to Neurology office for follow up of her seizures  Assessment/Plan:    Localization-related focal epilepsy with complex partial seizures ([facility] [address] )  The event that she experienced on 2/3 and the prior events are concerning for complex partial seizures  Her initial seizure which lead to the discovery of her meningioma also involved trouble with her language  I am very concerned that she drove immediately after the seizure, when she clearly was post-ictal  I am not surprised that her driving was erratic  -- Because of these recurrent events, I will have her increase her Topiramate to 150 mg twice a day  I discussed the risks and rationale for this increase  -- I discussed that legally, she cannot be driving for 6 months from her most recent seizure  We will need to fill out a [place] [employer] form because of the event with loss of awareness  Meningioma ([facility] [address] )  With her recurrent seizures (after being seizure free for a long period of time) and her worsening headaches, it is crucial that she get repeat brain imaging to look for change in the size of her meningioma  She missed her last MRI, and is planning to be back in touch with Dr Lawrence Ríos office to arrange follow up  I will reorder blood work to precede her MRI brain with contrast      Migraine with aura, not intractable, without status migrainosus  She does have prior migraines, and although we need to assess the status of her meningioma, hopefully by increasing her Topiramate, her headaches will improve  She will return to the office in about 3 months  Subjective:    HPI    Current seizure medications:  1  Topiramate 100 mg twice a day  Other medications as per Epic  I last saw her in the office on 8/15/2017   At that time, she was seizure free and was doing well after having transitioned off of Depakote and onto Topiramate  No changes were made to her seizure medications  She was planned to follow up with Neurosurgery to follow her meningioma last fall  Since her last visit, she was doing well, but over the last few months, she was noted to be having episodes where she was behaving abnormally  The most prominent occurred on 2/3/2018  With this event, she looked very tired and was walking around very unsteady  She then had a period of time where she lost memory and reportedly had an expressionless look on her face  She was talking slow and had difficulty finding words  She was not making sense when she was talking  It is unclear how long this event fully lasted, as she was confused and had patchy memory for an hour or two  She was out with family and actually drove her car home after the event, when she was still confused  Her  followed her in another vehicle and stated that she was driving quite poorly  She had two similar events previously, but they were not as prominent  With these events, she also had difficulty with her speech  Also over the last few months, she has been having worsening headaches  She has been feeling more tired and tremulous at times  She did not follow up with Neurosurgery and did not get her follow up MRI last fall  The following portions of the patient's history were reviewed and updated as appropriate: allergies, current medications and problem list          Objective:    Blood pressure 120/66, pulse 91, height 5' 4" (1 626 m), weight 94 1 kg (207 lb 8 oz)  Physical Exam   Constitutional: She appears well-developed  No distress  Eyes: EOM are normal  Pupils are equal, round, and reactive to light  Fundoscopic exam:       The right eye shows no papilledema  The left eye shows no papilledema  Cardiovascular:   No peripheral edema was present   Neurological: She has normal strength   Gait and coordination normal        Neurological Exam    Mental Status  The patient is alert and oriented to person, place, time and situation  Her recent and remote memory are normal  She is able to name object and repeat  She has normal attention span and concentration  She has a normal fund of knowledge  Cranial Nerves    CN II: The patient's visual acuity and visual fields are normal  Funduscopic exam performed  Right: There is no papilledema  Left: There is no papilledema  CN III, IV, VI: The patient's pupils are equally round and reactive to light and ocular movements are normal   CN V: The patient has normal facial sensation  CN VII:  The patient has symmetric facial movement  CN VIII:  The patient's hearing is normal   CN IX, X: The patient has symmetric palate movement  CN XI: The patient's shoulder shrug strength is normal   CN XII: The patient's tongue is midline without atrophy or fasciculations  Motor  The patient has normal muscle bulk throughout  Her overall muscle tone is normal throughout  Her strength is 5/5 throughout all four extremities  Sensory  The patient's sensation is to light touch  Reflexes  Deep tendon reflexes are 2+ and symmetric except as noted  She has right Walton reflex absent and left Walton reflex absent  Gait and Coordination  The patient has normal gait and station  She has normal tandem gait  Romberg's sign is negative  She has normal coordination bilaterally  ROS:    Review of Systems   Constitutional: Positive for fatigue and unexpected weight change  Negative for appetite change and fever  HENT: Positive for hearing loss  Negative for tinnitus, trouble swallowing and voice change  Eyes: Positive for visual disturbance  Negative for photophobia and pain  Respiratory: Negative  Negative for shortness of breath  Cardiovascular: Negative  Negative for palpitations  Gastrointestinal: Positive for diarrhea  Negative for nausea and vomiting  Endocrine: Negative    Negative for cold intolerance and heat intolerance  Hair Loss   Genitourinary: Positive for urgency  Negative for dysuria and frequency  Musculoskeletal: Positive for arthralgias, back pain, gait problem and neck pain  Negative for myalgias  Skin: Negative  Negative for rash  Neurological: Positive for dizziness, speech difficulty, light-headedness, numbness and headaches  Negative for tremors, seizures, syncope, facial asymmetry and weakness  Memory Problems  Taste or Smell Changes   Hematological: Negative  Does not bruise/bleed easily  Psychiatric/Behavioral: Positive for agitation and sleep disturbance  Negative for confusion and hallucinations  The patient is nervous/anxious

## 2018-02-16 NOTE — PATIENT INSTRUCTIONS
-- Please increase your Topiramate to 150 mg twice a day  -- Please reschedule your MRI and follow up with Dr Miguel Figueroa for your meningioma  -- The events you describe are most likely seizures  Because of these events, you cannot currently drive  According to the [place], you cannot drive for 6 months from your most recent seizure

## 2018-02-19 ENCOUNTER — APPOINTMENT (OUTPATIENT)
Dept: LAB | Facility: HOSPITAL | Age: 63
End: 2018-02-19
Attending: PSYCHIATRY & NEUROLOGY
Payer: MEDICARE

## 2018-02-19 DIAGNOSIS — G40.209 LOCALIZATION-RELATED FOCAL EPILEPSY WITH COMPLEX PARTIAL SEIZURES (HCC): ICD-10-CM

## 2018-02-19 DIAGNOSIS — D32.9 MENINGIOMA (HCC): ICD-10-CM

## 2018-02-19 LAB
BUN SERPL-MCNC: 19 MG/DL (ref 5–25)
CREAT SERPL-MCNC: 0.97 MG/DL (ref 0.6–1.3)
GFR SERPL CREATININE-BSD FRML MDRD: 62 ML/MIN/1.73SQ M

## 2018-02-19 PROCEDURE — 82565 ASSAY OF CREATININE: CPT

## 2018-02-19 PROCEDURE — 36415 COLL VENOUS BLD VENIPUNCTURE: CPT

## 2018-02-19 PROCEDURE — 84520 ASSAY OF UREA NITROGEN: CPT

## 2018-02-19 NOTE — ASSESSMENT & PLAN NOTE
She does have prior migraines, and although we need to assess the status of her meningioma, hopefully by increasing her Topiramate, her headaches will improve

## 2018-02-19 NOTE — ASSESSMENT & PLAN NOTE
The event that she experienced on 2/3 and the prior events are concerning for complex partial seizures  Her initial seizure which lead to the discovery of her meningioma also involved trouble with her language  I am very concerned that she drove immediately after the seizure, when she clearly was post-ictal  I am not surprised that her driving was erratic  -- Because of these recurrent events, I will have her increase her Topiramate to 150 mg twice a day  I discussed the risks and rationale for this increase  -- I discussed that legally, she cannot be driving for 6 months from her most recent seizure  We will need to fill out a [place] [employer] form because of the event with loss of awareness

## 2018-02-19 NOTE — ASSESSMENT & PLAN NOTE
With her recurrent seizures (after being seizure free for a long period of time) and her worsening headaches, it is crucial that she get repeat brain imaging to look for change in the size of her meningioma  She missed her last MRI, and is planning to be back in touch with Dr Mare Mark office to arrange follow up   I will reorder blood work to precede her MRI brain with contrast

## 2018-02-20 ENCOUNTER — TELEPHONE (OUTPATIENT)
Dept: RADIOLOGY | Facility: CLINIC | Age: 63
End: 2018-02-20

## 2018-02-20 ENCOUNTER — HOSPITAL ENCOUNTER (OUTPATIENT)
Dept: RADIOLOGY | Facility: CLINIC | Age: 63
Discharge: HOME | End: 2018-02-20
Attending: ANESTHESIOLOGY
Payer: MEDICARE

## 2018-02-20 VITALS
RESPIRATION RATE: 18 BRPM | TEMPERATURE: 98.8 F | HEART RATE: 71 BPM | DIASTOLIC BLOOD PRESSURE: 65 MMHG | SYSTOLIC BLOOD PRESSURE: 97 MMHG | OXYGEN SATURATION: 99 %

## 2018-02-20 DIAGNOSIS — M47.816 SPONDYLOSIS WITHOUT MYELOPATHY OR RADICULOPATHY, LUMBAR REGION: ICD-10-CM

## 2018-02-20 PROCEDURE — 64636 DESTROY L/S FACET JNT ADDL: CPT | Performed by: ANESTHESIOLOGY

## 2018-02-20 PROCEDURE — 64635 DESTROY LUMB/SAC FACET JNT: CPT | Performed by: ANESTHESIOLOGY

## 2018-02-20 RX ORDER — LIDOCAINE HYDROCHLORIDE 10 MG/ML
30 INJECTION, SOLUTION EPIDURAL; INFILTRATION; INTRACAUDAL; PERINEURAL ONCE
Status: DISCONTINUED | OUTPATIENT
Start: 2018-02-20 | End: 2018-02-20 | Stop reason: DRUGHIGH

## 2018-02-20 RX ORDER — LIDOCAINE HYDROCHLORIDE 10 MG/ML
35 INJECTION, SOLUTION EPIDURAL; INFILTRATION; INTRACAUDAL; PERINEURAL ONCE
Status: COMPLETED | OUTPATIENT
Start: 2018-02-20 | End: 2018-02-20

## 2018-02-20 RX ORDER — METHYLPREDNISOLONE ACETATE 80 MG/ML
80 INJECTION, SUSPENSION INTRA-ARTICULAR; INTRALESIONAL; INTRAMUSCULAR; PARENTERAL; SOFT TISSUE ONCE
Status: COMPLETED | OUTPATIENT
Start: 2018-02-20 | End: 2018-02-20

## 2018-02-20 RX ORDER — BUPIVACAINE HCL/PF 2.5 MG/ML
10 VIAL (ML) INJECTION ONCE
Status: COMPLETED | OUTPATIENT
Start: 2018-02-20 | End: 2018-02-20

## 2018-02-20 RX ADMIN — LIDOCAINE HYDROCHLORIDE 25 ML: 10 INJECTION, SOLUTION EPIDURAL; INFILTRATION; INTRACAUDAL; PERINEURAL at 10:53

## 2018-02-20 RX ADMIN — Medication 4 ML: at 11:10

## 2018-02-20 RX ADMIN — METHYLPREDNISOLONE ACETATE 20 MG: 80 INJECTION, SUSPENSION INTRA-ARTICULAR; INTRALESIONAL; INTRAMUSCULAR; PARENTERAL; SOFT TISSUE at 11:10

## 2018-02-20 NOTE — TELEPHONE ENCOUNTER
**To be called on 2/21/18   S/p R L2-L5 RFA on 2/20/18 by FQ  Pt coming in for L L2-L5 RFA on 3/6/18 to arrive at 10:15

## 2018-02-20 NOTE — DISCHARGE INSTRUCTIONS

## 2018-02-20 NOTE — H&P
History of Present Illness: The patient is a 57 y o  female who presents with complaints of right lower back pain secondary to lumbar spondylosis  She underwent diagnostic medial branch blocks with excellent relief and is here today for medial branch radiofrequency ablation      Patient Active Problem List   Diagnosis    Localization-related focal epilepsy with complex partial seizures ([facility] [address] )    [facility]    Migraine with aura, not intractable, without status migrainosus    Muscle pain, myofascial    Depression with anxiety       Past Medical History:   Diagnosis Date    Anxiety     Back pain     Benign brain tumor ([facility] [address]     Depression     Diabetes mellitus ([facility] [address] [facility]     Epilepsy ([facility] [address] )     Fatigue     Fatty liver     Headache     History of eczema     History of hepatitis     History of pneumonia     History of restless legs syndrome     History of scarlet fever     Hyperlipemia     Hypertension     Irritable bowel syndrome     Localization-related focal epilepsy with complex partial seizures (HCC)     Meningioma (HCC)     Migraine with aura and without status migrainosus     Myofascial muscle pain     Neck pain     Obesity     Pain in left knee     Right hip pain     Right shoulder pain     Sleep apnea     Spondylosis of cervical region without myelopathy or radiculopathy        Past Surgical History:   Procedure Laterality Date    BRAIN SURGERY      BREAST SURGERY Right     CARPECTOMY HAND Bilateral     CATARACT EXTRACTION      CHOLECYSTECTOMY      EYE SURGERY      LASER ABLATION CONDYLOMA CERVICAL / VULVAR      SHOULDER SURGERY Bilateral     TARSAL TUNNEL RELEASE      TUBAL LIGATION           Current Outpatient Prescriptions:     ALPRAZolam (XANAX) 0 5 mg tablet, Take by mouth, Disp: , Rfl:     ASPIRIN ADULT LOW DOSE PO, Take by mouth, Disp: , Rfl:     atorvastatin (LIPITOR) 40 mg tablet, Take 40 mg by mouth, Disp: , Rfl:     clonazePAM (KlonoPIN) 0 5 mg tablet, Take by mouth, Disp: , Rfl:     EPINEPHrine (EPIPEN 2-KYLIE) 0 3 mg/0 3 mL SOAJ, Inject as directed, Disp: , Rfl:     Insulin NPH Human, Isophane, (NOVOLIN N SC), Inject under the skin, Disp: , Rfl:     insulin regular (NOVOLIN R) 100 units/mL injection, Inject 7 unit marking on U-100 syringe under the skin 3 (three) times a day with meals, Disp: , Rfl:     lisinopril (ZESTRIL) 5 mg tablet, Take 5 mg by mouth, Disp: , Rfl:     topiramate (TOPAMAX) 100 mg tablet, Take 1 5 tablets (150 mg total) by mouth 2 (two) times a day, Disp: 90 tablet, Rfl: 11    Allergies   Allergen Reactions    Augmentin [Amoxicillin-Pot Clavulanate] Rash       Physical Exam:   Vitals:    02/20/18 1029   BP: 94/83   Pulse: 83   Resp: 18   Temp: 98 8 °F (37 1 °C)   SpO2: 99%     General: Awake, Alert, Oriented x 3, Mood and affect appropriate  Respiratory: Respirations even and unlabored  Cardiovascular: Peripheral pulses intact; no edema  Musculoskeletal Exam:  Right lower back tenderness    ASA Score:  2    Assessment:   1   Spondylosis without myelopathy or radiculopathy, lumbar region        Plan: R L2-L5 RFA

## 2018-03-02 ENCOUNTER — HOSPITAL ENCOUNTER (OUTPATIENT)
Dept: RADIOLOGY | Facility: HOSPITAL | Age: 63
Discharge: HOME | End: 2018-03-02
Attending: NEUROLOGICAL SURGERY
Payer: MEDICARE

## 2018-03-02 DIAGNOSIS — D32.9 BENIGN NEOPLASM OF MENINGES (HCC): ICD-10-CM

## 2018-03-02 PROCEDURE — A9585 GADOBUTROL INJECTION: HCPCS | Performed by: NEUROLOGICAL SURGERY

## 2018-03-02 PROCEDURE — 70553 MRI BRAIN STEM W/O & W/DYE: CPT

## 2018-03-02 RX ADMIN — GADOBUTROL 9 ML: 604.72 INJECTION INTRAVENOUS at 09:32

## 2018-03-06 ENCOUNTER — TELEPHONE (OUTPATIENT)
Dept: RADIOLOGY | Facility: CLINIC | Age: 63
End: 2018-03-06

## 2018-03-06 ENCOUNTER — HOSPITAL ENCOUNTER (OUTPATIENT)
Dept: RADIOLOGY | Facility: CLINIC | Age: 63
Discharge: HOME | End: 2018-03-06
Attending: ANESTHESIOLOGY | Admitting: ANESTHESIOLOGY
Payer: MEDICARE

## 2018-03-06 VITALS
SYSTOLIC BLOOD PRESSURE: 125 MMHG | OXYGEN SATURATION: 99 % | TEMPERATURE: 98.5 F | RESPIRATION RATE: 18 BRPM | HEART RATE: 77 BPM | DIASTOLIC BLOOD PRESSURE: 69 MMHG

## 2018-03-06 DIAGNOSIS — M47.816 SPONDYLOSIS WITHOUT MYELOPATHY OR RADICULOPATHY, LUMBAR REGION: ICD-10-CM

## 2018-03-06 PROCEDURE — 64636 DESTROY L/S FACET JNT ADDL: CPT | Performed by: ANESTHESIOLOGY

## 2018-03-06 PROCEDURE — 64635 DESTROY LUMB/SAC FACET JNT: CPT | Performed by: ANESTHESIOLOGY

## 2018-03-06 RX ORDER — BUPIVACAINE HCL/PF 2.5 MG/ML
10 VIAL (ML) INJECTION ONCE
Status: COMPLETED | OUTPATIENT
Start: 2018-03-06 | End: 2018-03-06

## 2018-03-06 RX ORDER — LIDOCAINE HYDROCHLORIDE 10 MG/ML
30 INJECTION, SOLUTION EPIDURAL; INFILTRATION; INTRACAUDAL; PERINEURAL ONCE
Status: COMPLETED | OUTPATIENT
Start: 2018-03-06 | End: 2018-03-06

## 2018-03-06 RX ORDER — METHYLPREDNISOLONE ACETATE 80 MG/ML
80 INJECTION, SUSPENSION INTRA-ARTICULAR; INTRALESIONAL; INTRAMUSCULAR; PARENTERAL; SOFT TISSUE ONCE
Status: COMPLETED | OUTPATIENT
Start: 2018-03-06 | End: 2018-03-06

## 2018-03-06 RX ADMIN — LIDOCAINE HYDROCHLORIDE 25 ML: 10 INJECTION, SOLUTION EPIDURAL; INFILTRATION; INTRACAUDAL; PERINEURAL at 10:51

## 2018-03-06 RX ADMIN — Medication 4 ML: at 11:07

## 2018-03-06 RX ADMIN — METHYLPREDNISOLONE ACETATE 20 MG: 80 INJECTION, SUSPENSION INTRA-ARTICULAR; INTRALESIONAL; INTRAMUSCULAR; PARENTERAL; SOFT TISSUE at 11:07

## 2018-03-06 NOTE — DISCHARGE INSTRUCTIONS

## 2018-03-06 NOTE — H&P
History of Present Illness: The patient is a 57 y o  female who presents with complaints of left lower back pain secondary to lumbar spondylosis  She has previously undergone lumbar medial branch blocks with excellent relief and is here today for left L2-5 medial branch radiofrequency ablation      Patient Active Problem List   Diagnosis    Localization-related focal epilepsy with complex partial seizures ([facility] [address]    [facility]    Migraine with aura, not intractable, without status migrainosus    Muscle pain, myofascial    Depression with anxiety       Past Medical History:   Diagnosis Date    Anxiety     Back pain     Benign brain tumor ([facility] [address]     Depression     Diabetes mellitus ([facility] [address]     Epilepsy ([facility] [address]     Fatigue     Fatty liver     Headache     History of eczema     History of hepatitis     History of pneumonia     History of restless legs syndrome     History of scarlet fever     Hyperlipemia     Hypertension     Irritable bowel syndrome     Localization-related focal epilepsy with complex partial seizures (HCC)     Meningioma (HCC)     Migraine with aura and without status migrainosus     Myofascial muscle pain     Neck pain     Obesity     Pain in left knee     Right hip pain     Right shoulder pain     Sleep apnea     Spondylosis of cervical region without myelopathy or radiculopathy        Past Surgical History:   Procedure Laterality Date    BRAIN SURGERY      BREAST SURGERY Right     CARPECTOMY HAND Bilateral     CATARACT EXTRACTION      CHOLECYSTECTOMY      EYE SURGERY      LASER ABLATION CONDYLOMA CERVICAL / VULVAR      SHOULDER SURGERY Bilateral     TARSAL TUNNEL RELEASE      TUBAL LIGATION           Current Outpatient Prescriptions:     ALPRAZolam (XANAX) 0 5 mg tablet, Take by mouth, Disp: , Rfl:     ASPIRIN ADULT LOW DOSE PO, Take by mouth, Disp: , Rfl:     atorvastatin (LIPITOR) 40 mg tablet, Take 40 mg by mouth, Disp: , Rfl:     clonazePAM (KlonoPIN) 0 5 mg tablet, Take by mouth, Disp: , Rfl:     EPINEPHrine (EPIPEN 2-KYLIE) 0 3 mg/0 3 mL SOAJ, Inject as directed, Disp: , Rfl:     Insulin NPH Human, Isophane, (NOVOLIN N SC), Inject under the skin, Disp: , Rfl:     insulin regular (NOVOLIN R) 100 units/mL injection, Inject 7 unit marking on U-100 syringe under the skin 3 (three) times a day with meals, Disp: , Rfl:     lisinopril (ZESTRIL) 5 mg tablet, Take 5 mg by mouth, Disp: , Rfl:     topiramate (TOPAMAX) 100 mg tablet, Take 1 5 tablets (150 mg total) by mouth 2 (two) times a day, Disp: 90 tablet, Rfl: 11    Allergies   Allergen Reactions    Food Anaphylaxis     Annotation - [id number]: Elderberry Jelly    Quetiapine Hypertension    Augmentin [Amoxicillin-Pot Clavulanate] Rash       Physical Exam:   Vitals:    03/06/18 1019   BP: 125/69   Pulse: 77   Resp: 20   Temp: 98 5 °F (36 9 °C)   SpO2: 99%     General: Awake, Alert, Oriented x 3, Mood and affect appropriate  Respiratory: Respirations even and unlabored  Cardiovascular: Peripheral pulses intact; no edema  Musculoskeletal Exam:  Left lower back tenderness over the facet joints    ASA Score: 2    Assessment:   1   Spondylosis without myelopathy or radiculopathy, lumbar region        Plan: L L2-L5 RFA

## 2018-03-06 NOTE — TELEPHONE ENCOUNTER
** Call pt on 3/8/18**  S/P Left L2 -L5 RFA on 3/6/18 w/ FQ   Has ov on 5/16/18 arriving at 8:45 w/ FQ

## 2018-03-07 NOTE — PROCEDURES
Procedure      Pre-procedure Diagnosis: Lumbar Facet Arthropathy  Post-procedure Diagnosis: Lumbar Facet Arthropathy  Procedure Title(s):  Bilateral L2-5 medial branch nerve blocks   Attending Surgeon:   Juice Louie MD  Anesthesia:   Local     Indications: The patient is a 57year-old female with a diagnosis of lumbar facet arthropathy  The patient's history and physical exam were reviewed  The risks, benefits and alternatives to the procedure were discussed, and all questions were answered to the patient's satisfaction  The patient agreed to proceed, and written informed consent was obtained  Procedure in Detail: The patient was brought into the procedure room and placed in the prone position on the fluoroscopy table  The area of the lumbar spine was prepped with chloraprep and then draped in a sterile manner  AP fluoroscopy was used to identify the L2-5 levels on the left side  The C-arm was obliqued to visualize the junction of the superior articulate process and transverse process  The sacral ala was identified and marked  The skin in these identified areas was anesthetized with 1% lidocaine  A 25-gauge, 3Â½-inch spinal needle was advanced toward each of these points under fluoroscopic guidance  Once bone was contacted, negative aspiration was confirmed and 0 25 mL of bupivacaine 0 25% was injected at each level  The same procedure was performed on the opposite side  After the procedure was completed, the patient's back was cleaned and bandages were placed at the needle insertion sites  Disposition: The patient tolerated the procedure well, and there were no apparent complications  The patient was taken to the recovery area where written discharge instructions for the procedure were given  The patient was given a pain diary to determine if the patient's pain improves following the injection  Once the diary is returned we will consider next appropriate course of treatment      Postoperative pain relief was significant            Signatures   Electronically signed by : Levi Duffy MD; Nov 21 2017  8:38AM EST                       (Author)

## 2018-03-08 NOTE — TELEPHONE ENCOUNTER
S/W pt who reports a little soreness still from RFA, has used ice or heat as needed but hasn't required any pain medication  Needle sites are fine with no s/s of infection  Pt denies fevers or sunburn like sensation  I questioned pt why her post RFA ov is 10 wks out instead of 6 wks out  Pt said they requested an appt in May b/c her husb is her  and b/c of his work schedule they would need an end of the day appt so they scheduled ov in May when he has some time off  Told pt I understood and would make FQ aware but to call the office sooner if she has any issues

## 2018-03-18 DIAGNOSIS — I10 ESSENTIAL HYPERTENSION: Primary | ICD-10-CM

## 2018-03-18 DIAGNOSIS — G40.209 LOCALIZATION-RELATED FOCAL EPILEPSY WITH COMPLEX PARTIAL SEIZURES (HCC): ICD-10-CM

## 2018-03-18 RX ORDER — TOPIRAMATE 100 MG/1
TABLET, FILM COATED ORAL
Qty: 270 TABLET | Refills: 11 | Status: SHIPPED | OUTPATIENT
Start: 2018-03-18

## 2018-03-19 RX ORDER — HYDROCHLOROTHIAZIDE 25 MG/1
TABLET ORAL
Qty: 90 TABLET | Refills: 3 | Status: SHIPPED | OUTPATIENT
Start: 2018-03-19 | End: 2018-10-29 | Stop reason: SDUPTHER

## 2018-04-06 ENCOUNTER — OFFICE VISIT (OUTPATIENT)
Dept: NEUROSURGERY | Facility: CLINIC | Age: 63
End: 2018-04-06
Payer: MEDICARE

## 2018-04-06 VITALS
HEART RATE: 86 BPM | WEIGHT: 201 LBS | BODY MASS INDEX: 34.31 KG/M2 | DIASTOLIC BLOOD PRESSURE: 60 MMHG | TEMPERATURE: 98.4 F | HEIGHT: 64 IN | RESPIRATION RATE: 16 BRPM | SYSTOLIC BLOOD PRESSURE: 115 MMHG

## 2018-04-06 DIAGNOSIS — D33.0 BENIGN NEOPLASM OF SUPRATENTORIAL REGION OF BRAIN (HCC): ICD-10-CM

## 2018-04-06 DIAGNOSIS — Z01.818 PREPROCEDURAL EXAMINATION: ICD-10-CM

## 2018-04-06 DIAGNOSIS — D32.9 MENINGIOMA (HCC): Primary | ICD-10-CM

## 2018-04-06 DIAGNOSIS — R51.9 NONINTRACTABLE HEADACHE, UNSPECIFIED CHRONICITY PATTERN, UNSPECIFIED HEADACHE TYPE: ICD-10-CM

## 2018-04-06 DIAGNOSIS — Z48.89 AFTERCARE FOLLOWING SURGERY: ICD-10-CM

## 2018-04-06 PROBLEM — D33.2 BENIGN BRAIN TUMOR (HCC): Status: ACTIVE | Noted: 2018-04-06

## 2018-04-06 PROCEDURE — 99214 OFFICE O/P EST MOD 30 MIN: CPT | Performed by: NEUROLOGICAL SURGERY

## 2018-04-06 RX ORDER — FLUOXETINE HYDROCHLORIDE 40 MG/1
40 CAPSULE ORAL DAILY
COMMUNITY
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
## 2018-04-12 ENCOUNTER — HOSPITAL ENCOUNTER (EMERGENCY)
Facility: HOSPITAL | Age: 63
Discharge: HOME | End: 2018-04-13
Attending: EMERGENCY MEDICINE
Payer: MEDICARE

## 2018-04-12 VITALS
SYSTOLIC BLOOD PRESSURE: 113 MMHG | HEART RATE: 98 BPM | BODY MASS INDEX: 34.33 KG/M2 | TEMPERATURE: 98.6 F | DIASTOLIC BLOOD PRESSURE: 66 MMHG | WEIGHT: 200 LBS | OXYGEN SATURATION: 98 % | RESPIRATION RATE: 20 BRPM

## 2018-04-12 DIAGNOSIS — R04.0 EPISTAXIS: Primary | ICD-10-CM

## 2018-04-12 RX ORDER — OXYMETAZOLINE HYDROCHLORIDE 0.05 G/100ML
2 SPRAY NASAL ONCE
Status: COMPLETED | OUTPATIENT
Start: 2018-04-12 | End: 2018-04-12

## 2018-04-12 RX ADMIN — OXYMETAZOLINE HYDROCHLORIDE 2 SPRAY: 5 SPRAY NASAL at 22:50

## 2018-04-12 RX ADMIN — SILVER NITRATE APPLICATORS 1 APPLICATOR: 25; 75 STICK TOPICAL at 23:30

## 2018-04-13 PROCEDURE — 99283 EMERGENCY DEPT VISIT LOW MDM: CPT

## 2018-04-13 NOTE — ED ATTENDING ATTESTATION
Ivery Peabody MD, saw and evaluated the patient  All available labs and X-rays were ordered by me or the resident and have been reviewed by myself  I discussed the patient with the resident / non-physician and agree with the resident's / non-physician practitioner's findings and plan as documented in the resident's / non-physician practicitioner's note, except where noted  At this point, I agree with the current assessment done in the ED  Chief Complaint   Patient presents with    Nose Bleed     pt states she had nose bleed this morning that continued for 3 5hrs  states currently her nose has bleeding for 5 5hrs  pt takes baby asa daily  This is a 70-year-old female who states that for the last few hours she has been having a continuous right near nose bleed  She has had nose bleeds in the past, would hold pressure below the nasal bridge and with spontaneously terminate however despite doing that today nothing seems to be helping  She stuffed a Kleenex or paper towel in her right knee air and then started knows bleeding in her left knee air  She is also swallowing blood  She denies any fevers chills nausea vomiting chest pain shortness of breath  Denies dizziness or lightheadedness  Denies falls or trauma  She does take 81 mg of aspirin every day  PE:  Vitals:    04/12/18 2205 04/12/18 2207   BP:  113/66   Pulse: 98    Resp: 20    Temp: 98 6 °F (37 °C)    TempSrc: Tympanic    SpO2: 98%    Weight: 90 7 kg (200 lb)    General: VS reviewed  Appears in NAD  awake, alert  Well-nourished, well-developed  Appears stated age  Speaking normally in full sentences  Head: Normocephalic, atraumatic, nontender  Eyes: EOM-I  No diplopia  No hyphema  No subconjunctival hemorrhages  Symmetrical lids  ENT: Atraumatic external nose and ears  There is active bleeding from the right gomez, I had her blow large amount of clots from the right nare  Bleeding and terminated after this  MMM  No malocclusion  No stridor  Normal phonation  No drooling  Normal swallowing  Neck: No JVD  CV: No pallor noted  Peripheral pulses +2 throughout  No chest wall tenderness  Lungs:   No tachypnea  No respiratory distress  MSK:   FROM   Skin: Dry, intact  Neuro: Awake, alert, GCS15, CN II-XII grossly intact  Motor grossly intact  Psychiatric/Behavioral: Appropriate mood and affect   Exam: deferred  A/P:  -  Holding pressure, apply Afrin, will re-evaluate  Packing verses TXA vs obs  - 13 point ROS was performed and all are normal unless stated in the history above  - Nursing note reviewed  Vitals reviewed  - Orders placed by myself and/or advanced practitioner / resident     - Previous chart was not reviewed  - No language barrier    - History obtained from patient  - There are no limitations to the history obtained  - Critical care time: Not applicable for this patient  Final Diagnosis:  1  Epistaxis        ED Course      Medications   oxymetazoline (AFRIN) 0 05 % nasal spray 2 spray (2 sprays Each Nare Given 4/12/18 2250)   silver nitrate-potassium nitrate (ARZOL SILVER NITRATE) 57-39 % applicator 1 applicator (1 applicator Topical Given 4/12/18 2330)     No orders to display     No orders of the defined types were placed in this encounter  Labs Reviewed - No data to display  Time reflects when diagnosis was documented in both MDM as applicable and the Disposition within this note     Time User Action Codes Description Comment    4/12/2018 11:45 PM Marjorie Ramirez Add [R04 0] Epistaxis       ED Disposition     ED Disposition Condition Comment    Discharge  [facility] discharge to home/self care  Condition at discharge: Good        Follow-up Information     Follow up With Specialties Details Why Contact Info    Farzad Parrish MD Family Medicine  As needed [place] [address] [place] [address] [phone]      [facility]   As needed [address] [facility] [address] [facility]        Discharge Medication List as of 4/12/2018 11:47 PM      CONTINUE these medications which have NOT CHANGED    Details   ALPRAZolam (XANAX) 0 5 mg tablet Take by mouth, Until Discontinued, Historical Med      ASPIRIN ADULT LOW DOSE PO Take by mouth, Until Discontinued, Historical Med      atorvastatin (LIPITOR) 40 mg tablet Take 40 mg by mouth, Until Discontinued, Historical Med      clonazePAM (KlonoPIN) 0 5 mg tablet Take by mouth, Starting Mon 10/12/2015, Historical Med      EPINEPHrine (EPIPEN 2-KYLIE) 0 3 mg/0 3 mL SOAJ Inject as directed, Starting Mon 10/6/2014, Historical Med      FLUoxetine (PROzac) 40 MG capsule Take 40 mg by mouth daily, Historical Med      hydrochlorothiazide (HYDRODIURIL) 25 mg tablet TAKE 1 TABLET BY MOUTH DAILY, Normal      Insulin NPH Human, Isophane, (NOVOLIN N SC) Inject under the skin, Until Discontinued, Historical Med      insulin regular (NOVOLIN R) 100 units/mL injection Inject 7 unit marking on U-100 syringe under the skin 3 (three) times a day with meals, Until Discontinued, Historical Med      lisinopril (ZESTRIL) 5 mg tablet Take 5 mg by mouth, Until Discontinued, Historical Med      topiramate (TOPAMAX) 100 mg tablet TAKE 1 5 TABLETS BY MOUTH 2 TIMES A DAY, Normal           No discharge procedures on file  Prior to Admission Medications   Prescriptions Last Dose Informant Patient Reported? Taking?    ALPRAZolam (XANAX) 0 5 mg tablet 4/11/2018 at Unknown time  Yes Yes   Sig: Take by mouth   ASPIRIN ADULT LOW DOSE PO 4/12/2018 at Unknown time  Yes Yes   Sig: Take by mouth   EPINEPHrine (EPIPEN 2-KYLIE) 0 3 mg/0 3 mL SOAJ 4/12/2018 at Unknown time  Yes Yes   Sig: Inject as directed   FLUoxetine (PROzac) 40 MG capsule 4/12/2018 at Unknown time  Yes Yes   Sig: Take 40 mg by mouth daily   Insulin NPH Human, Isophane, (NOVOLIN N SC) 4/12/2018 at Unknown time  Yes Yes   Sig: Inject under the skin   atorvastatin (LIPITOR) 40 mg tablet 4/12/2018 at Unknown time  Yes Yes   Sig: Take 40 mg by mouth   clonazePAM (KlonoPIN) 0 5 mg tablet 4/11/2018 at Unknown time  Yes Yes   Sig: Take by mouth   hydrochlorothiazide (HYDRODIURIL) 25 mg tablet 4/12/2018 at Unknown time  No Yes   Sig: TAKE 1 TABLET BY MOUTH DAILY   insulin regular (NOVOLIN R) 100 units/mL injection 4/12/2018 at Unknown time  Yes Yes   Sig: Inject 7 unit marking on U-100 syringe under the skin 3 (three) times a day with meals   lisinopril (ZESTRIL) 5 mg tablet 4/12/2018 at Unknown time  Yes Yes   Sig: Take 5 mg by mouth   topiramate (TOPAMAX) 100 mg tablet 4/12/2018 at Unknown time  No Yes   Sig: TAKE 1 5 TABLETS BY MOUTH 2 TIMES A DAY      Facility-Administered Medications: None       Portions of the record may have been created with voice recognition software  Occasional wrong word or "sound a like" substitutions may have occurred due to the inherent limitations of voice recognition software  Read the chart carefully and recognize, using context, where substitutions have occurred      Electronically signed by:  Jen Mosqueda

## 2018-04-13 NOTE — ED NOTES
Epistaxis temporarily subsided  Pt resting comfortably  Emesis basin and gauze given       Brittany Harris RN  04/12/18 9477
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
## 2018-04-13 NOTE — DISCHARGE INSTRUCTIONS
If your nose starts to bleed again hold pressure for 3-5 minutes before checking if it stopped  Do this 3 times  You can use the Afrin we gave you, if needed  Do not use this for more than 3 days in a row  Follow up with your PCP and our ENT doctors, as needed  Return to the ER for new or worrisome symptoms  Epistaxis   WHAT YOU SHOULD KNOW:   Epistaxis is a nosebleed  A nosebleed occurs when the blood vessels near the surface of the nasal cavity are injured or damaged  AFTER YOU LEAVE:   Medicines:   · Nasal sprays:  Vasoconstrictor nasal spray is a medicine that helps make nasal blood vessels narrower  This limits the blood flow and stops the bleeding  This medicine also decreases the swelling inside your nose and helps you breathe easier  You may also be directed to use saline or other nasal sprays to add moisture to your nose  · Antibiotics: This medicine is given to help treat or prevent an infection caused by bacteria  · Take your medicine as directed  Call your healthcare provider if you think your medicine is not helping or if you have side effects  Tell him if you are allergic to any medicine  Keep a list of the medicines, vitamins, and herbs you take  Include the amounts, and when and why you take them  Bring the list or the pill bottles to follow-up visits  Carry your medicine list with you in case of an emergency  Follow up with your primary healthcare provider or otolaryngologist within 2 to 3 days or as directed: Any packing in your nose should be removed within 2 to 3 days  Write down your questions so you remember to ask them during your visits  First aid:   · Sit up and lean forward: This will help prevent you from swallowing blood  Spit blood and saliva into a bowl  · Apply pressure to your nose:  Use 2 fingers to pinch your nose shut for 10 minutes  This will help stop the bleeding  Breathe through your mouth             · Apply ice:  Use a cold pack or put crushed ice in a bag, cover with a towel, and place on the bridge of your nose  · Nasal packing:  Pack your nose with a cotton ball, tissue, tampon, or gauze bandage to stop the bleeding  Prevent epistaxis:  · Avoid nose picking and blowing your nose too hard: You can irritate or damage your nose if you pick it  Blowing your nose too hard may cause the bleeding to start again  · Avoid irritants:  Substances that can irritate your nose should be avoided  These include tobacco smoke and chemical sprays such as  that contain ammonia  · Use a cool mist humidifier in your home: This will add the moisture to the air and help keep your nose moist      · Put a small amount of petroleum jelly inside your nostrils: You may apply a small amount of petroleum jelly if you do not have a nasal packing  This will help keep your nose from drying out or getting irritated  Do not put anything else inside your nose unless your primary healthcare provider tells you to do so  Contact your primary healthcare provider or otolaryngologist if:   · You have a fever and are vomiting  · You have pain in and around your nose that is getting worse even after you take pain medicines  · Your nasal pack is loose  · You have questions or concerns about your condition or care  Seek care immediately if:   Your nasal packing is soaked with blood  · Your nose is still bleeding after 20 minutes, even after you pinch it  · You have a foul-smelling discharge coming out of your nose  · You feel so weak and dizzy that you have trouble standing up  · You have trouble breathing or talking  © 2014 [employer] is for End User's use only and may not be sold, redistributed or otherwise used for commercial purposes  All illustrations and images included in CareNotes® are the copyrighted property of [employer]  or [employer]om[employer]  The above information is an  only   It is not intended as medical advice for individual conditions or treatments  Talk to your doctor, nurse or pharmacist before following any medical regimen to see if it is safe and effective for you  Oxymetazoline (Into the nose)   Oxymetazoline ([id number]-ug-ESTEBAN-oh-eloy)  Helps relieve a stuffy nose  Brand Name(s): 12-Hour Nasal, 4-Way Long Lasting, Afrin, Afrin Extra Moisturizing, Afrin No-Drip Sinus, Afrin PumpMist, Afrin Sinus, Afrin w/Menthol, Dristan 12-Hr, Duramist Plus, Good Neighbor Nasal Felch Pump, [facility] Nasal Felch, Good Sense Nasal Spray, Mucinex Full Force, Mucinex Moisture Smart   There may be other brand names for this medicine  When This Medicine Should Not Be Used: This medicine is not right for everyone  Do not use it if you had an allergic reaction to oxymetazoline  How to Use This Medicine:   Spray  · Your doctor will tell you how much medicine to use  Do not use more than directed  · Follow the instructions on the medicine label if you are using this medicine without a prescription  · If you are using the nasal spray for the first time, you will need to prime the spray  To do this, pump or squeeze the bottle until some of the medicine sprays out  Now it is ready to use  Prime the spray after each time you clean the pump, or if you have not used the medicine for 5 days or longer  · Shake the nasal spray well before each use  · Before using the medicine, gently blow your nose to clear the nostrils  · Keep your head upright while spraying the medicine into each nostril  Repeat until you have used 2 to 3 sprays in each nostril  · Do not use more than two times in 24 hours, unless your doctor tells you to  · Do not share this medicine with other people  · After using the nasal spray, wipe the tip of the bottle with a clean tissue and put the cap back on   · Missed dose: Take a dose as soon as you remember   If it is almost time for your next dose, wait until then and take a regular dose  Do not take extra medicine to make up for a missed dose  · Keep the bottle tightly closed when not using it  Store at room temperature, away from heat and direct light  Drugs and Foods to Avoid:      Ask your doctor or pharmacist before using any other medicine, including over-the-counter medicines, vitamins, and herbal products  Warnings While Using This Medicine:   · Tell your doctor if you are pregnant or breastfeeding, or if you have heart disease, high blood pressure, diabetes, thyroid problems, or an enlarged prostate  · Do not use this medicine for more than 3 days unless your doctor tell you to  · Call your doctor if your symptoms do not improve or if they get worse  · Keep all medicine out of the reach of children  Never share your medicine with anyone  Possible Side Effects While Using This Medicine:   Call your doctor right away if you notice any of these side effects:  · Allergic reaction: Itching or hives, swelling in your face or hands, swelling or tingling in your mouth or throat, chest tightness, trouble breathing  If you notice these less serious side effects, talk with your doctor:   · Burning or stinging inside of your nose  If you notice other side effects that you think are caused by this medicine, tell your doctor  Call your doctor for medical advice about side effects  You may report side effects to FDA at 5-032-FDA-1765  © 2017 [address] [employer] Information is for End User's use only and may not be sold, redistributed or otherwise used for commercial purposes  The above information is an  only  It is not intended as medical advice for individual conditions or treatments  Talk to your doctor, nurse or pharmacist before following any medical regimen to see if it is safe and effective for you

## 2018-04-27 DIAGNOSIS — I10 ESSENTIAL HYPERTENSION: Primary | ICD-10-CM

## 2018-04-27 RX ORDER — LISINOPRIL 20 MG/1
TABLET ORAL
Qty: 30 TABLET | Refills: 5 | Status: SHIPPED | OUTPATIENT
Start: 2018-04-27 | End: 2018-10-29 | Stop reason: SDUPTHER

## 2018-05-11 ENCOUNTER — OFFICE VISIT (OUTPATIENT)
Dept: NEUROLOGY | Facility: CLINIC | Age: 63
End: 2018-05-11
Payer: MEDICARE

## 2018-05-11 VITALS
WEIGHT: 199.3 LBS | SYSTOLIC BLOOD PRESSURE: 120 MMHG | BODY MASS INDEX: 34.02 KG/M2 | DIASTOLIC BLOOD PRESSURE: 70 MMHG | HEIGHT: 64 IN | HEART RATE: 90 BPM

## 2018-05-11 DIAGNOSIS — G40.209 LOCALIZATION-RELATED FOCAL EPILEPSY WITH COMPLEX PARTIAL SEIZURES (HCC): Primary | ICD-10-CM

## 2018-05-11 DIAGNOSIS — M79.18 MUSCLE PAIN, MYOFASCIAL: ICD-10-CM

## 2018-05-11 DIAGNOSIS — D32.9 MENINGIOMA (HCC): ICD-10-CM

## 2018-05-11 DIAGNOSIS — G44.86 CERVICOGENIC HEADACHE: ICD-10-CM

## 2018-05-11 PROCEDURE — 99214 OFFICE O/P EST MOD 30 MIN: CPT | Performed by: PSYCHIATRY & NEUROLOGY

## 2018-05-11 RX ORDER — TIZANIDINE 2 MG/1
TABLET ORAL
Qty: 90 TABLET | Refills: 5 | Status: SHIPPED | OUTPATIENT
Start: 2018-05-11

## 2018-05-11 RX ORDER — TIZANIDINE 2 MG/1
2 TABLET ORAL
Qty: 30 TABLET | Refills: 5 | Status: SHIPPED | OUTPATIENT
Start: 2018-05-11 | End: 2018-05-11 | Stop reason: SDUPTHER
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
## 2018-05-11 NOTE — PATIENT INSTRUCTIONS
-- Start taking tizanidine 1 mg (half a tab) nightly for 1-2 weeks  If your neck stiffness and headaches persist, you can increase to 2 mg nightly   -- When the headache is more severe, you can try taking Naproxen 440 mg (2 tabs) up to every 12 hours  -- Please work with physical therapy to improve your neck range of motion and stiffness  -- Continue Topiramate 150 mg twice a day

## 2018-05-16 ENCOUNTER — OFFICE VISIT (OUTPATIENT)
Dept: PAIN MEDICINE | Facility: CLINIC | Age: 63
End: 2018-05-16
Payer: MEDICARE

## 2018-05-16 VITALS
DIASTOLIC BLOOD PRESSURE: 78 MMHG | TEMPERATURE: 98.2 F | WEIGHT: 203 LBS | SYSTOLIC BLOOD PRESSURE: 124 MMHG | HEART RATE: 86 BPM | BODY MASS INDEX: 34.66 KG/M2 | HEIGHT: 64 IN

## 2018-05-16 DIAGNOSIS — M47.816 LUMBAR SPONDYLOSIS: ICD-10-CM

## 2018-05-16 DIAGNOSIS — M54.81 BILATERAL OCCIPITAL NEURALGIA: Primary | ICD-10-CM

## 2018-05-16 DIAGNOSIS — M79.18 MUSCLE PAIN, MYOFASCIAL: ICD-10-CM

## 2018-05-16 PROCEDURE — 99214 OFFICE O/P EST MOD 30 MIN: CPT | Performed by: ANESTHESIOLOGY
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
## 2018-05-18 ENCOUNTER — PROCEDURE VISIT (OUTPATIENT)
Dept: PAIN MEDICINE | Facility: CLINIC | Age: 63
End: 2018-05-18
Payer: MEDICARE

## 2018-05-18 DIAGNOSIS — M54.81 BILATERAL OCCIPITAL NEURALGIA: Primary | ICD-10-CM

## 2018-05-18 PROCEDURE — 64405 NJX AA&/STRD GR OCPL NRV: CPT | Performed by: ANESTHESIOLOGY

## 2018-05-18 NOTE — PROGRESS NOTES
Pre-procedure Diagnosis:   1  Bilateral occipital neuralgia      Post-procedure Diagnosis:   1  Bilateral occipital neuralgia      Operation Title(s):   Bilateral occipital nerve block  Attending Surgeon:   Alberta Engle MD  Anesthesia:   Local    Indications: The patient is a 57y o  year-old female with a diagnosis of   1  Bilateral occipital neuralgia      The patient's history and physical exam were reviewed  The risks, benefits and alternatives to the procedure were discussed, and all questions were answered to the patient's satisfaction  The patient agreed to proceed, and written informed consent was obtained  Procedure in Detail: The patient was brought into the exam room and placed in the sitting position on the exam room chair with the head flexed on a pillow  The area of the  left occiput was palpated and cleansed with alcohol  Then a 1 5 inch 25 guage needle was advanced until bone was contacted  Negative aspiration was confirmed and a solution consisting of  1 mL 0 25% bupivacaine and  0 5 mL Depo-Medrol (40mg/ml) was easily injected  The same procedure was repeated for the opposite side    Disposition: The patient tolerated the procedure well and there were no apparent complications  The patient was given written discharge instructions for the procedure

## 2018-05-25 ENCOUNTER — TELEPHONE (OUTPATIENT)
Dept: PAIN MEDICINE | Facility: CLINIC | Age: 63
End: 2018-05-25

## 2018-05-26 DIAGNOSIS — E78.5 HYPERLIPIDEMIA, UNSPECIFIED HYPERLIPIDEMIA TYPE: Primary | ICD-10-CM

## 2018-05-29 RX ORDER — ATORVASTATIN CALCIUM 40 MG/1
TABLET, FILM COATED ORAL
Qty: 90 TABLET | Refills: 3 | Status: SHIPPED | OUTPATIENT
Start: 2018-05-29 | End: 2018-10-29 | Stop reason: SDUPTHER

## 2018-05-29 NOTE — ASSESSMENT & PLAN NOTE
She has not had any episodes concerning for seizures since her increased dose of topiramate  She is tolerating this well without side effects  Because she has not had any further seizures, I will not make any changes to her topiramate today  --she will continue topiramate 150 mg twice a day  If she would have any additional seizures, her dose could be increased further
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
## 2018-06-05 NOTE — TELEPHONE ENCOUNTER
Patient states that she is doing better  Patient states that the headaches are better  Patient she has 3 to 4 instead of 8 to 10  Headaches are also not as intense  Pain scale is 2/10 with 80% relief  Pain is experiencing some upper back pain in between her shoulder blades  She describes the pain as intense with sharp and burning feel  She rates the pain as 9/10

## 2018-07-11 ENCOUNTER — OFFICE VISIT (OUTPATIENT)
Dept: NEUROLOGY | Facility: CLINIC | Age: 63
End: 2018-07-11
Payer: MEDICARE

## 2018-07-11 VITALS
BODY MASS INDEX: 34.69 KG/M2 | HEIGHT: 64 IN | DIASTOLIC BLOOD PRESSURE: 52 MMHG | SYSTOLIC BLOOD PRESSURE: 106 MMHG | WEIGHT: 203.2 LBS | HEART RATE: 79 BPM

## 2018-07-11 DIAGNOSIS — D32.9 MENINGIOMA (HCC): ICD-10-CM

## 2018-07-11 DIAGNOSIS — G43.109 MIGRAINE WITH AURA, NOT INTRACTABLE, WITHOUT STATUS MIGRAINOSUS: Primary | ICD-10-CM

## 2018-07-11 DIAGNOSIS — G40.209 LOCALIZATION-RELATED FOCAL EPILEPSY WITH COMPLEX PARTIAL SEIZURES (HCC): ICD-10-CM

## 2018-07-11 PROCEDURE — 99214 OFFICE O/P EST MOD 30 MIN: CPT | Performed by: PSYCHIATRY & NEUROLOGY

## 2018-07-11 RX ORDER — COVID-19 ANTIGEN TEST
KIT MISCELLANEOUS
COMMUNITY

## 2018-07-11 NOTE — PROGRESS NOTES
Patient ID: Rayna Eagle is a 57 y o  female with left parietal meningioma s/p resection, migraine headaches, and localization related epilepsy, who is returning to Neurology office for follow up of her seizures and headaches  Assessment/Plan:    Localization-related focal epilepsy with complex partial seizures ([address] )  She has not had any additional seizures and has been tolerating topiramate well without side effects  Because she is doing well, I will not make any changes to her medications today  --she will continue topiramate 150 milligrams twice a day    Meningioma [facility])  She will continue to follow with Neurosurgery    Migraine with aura, not intractable, without status migrainosus  Her headaches have overall improved since her last visit  This is likely due to combination of having used tizanidine and having gotten occipital nerve blocks  I discussed that overall her headaches continue to have a component of cervicogenic headache  She continues to have a significant amount of muscle and neck soreness that least her headaches  She can continue to use tizanidine for this, but she may also benefit from working with physical therapy  I strongly encouraged her to work with PT  She will return to the office in about 3 months  Subjective:    HPI  Current seizure medications:  1  Topiramate 150 mg twice a day  2  Tizanidine 2 mg prn headache  Other medications as per Epic  I last saw her in the office on 5/11/2018  At that time, she was not having any additional seizures, but was having very frequent headaches, which appeared to have a large cervicogenic component  She was started on Tizanidine and her topiramate was continued unchanged  Since her last visit, she did start taking tizanidine, which she feels was a little helpful, but it was not fully effective for her headaches  She did see Pain Management, and had trigger point injections for occipital neuralgia    This was helpful, but then her headaches returned  She has been using Aleve and tizanidine, which she did take up to twice a day, with some improvement of her headache  She did not yet work with physical therapy    Her history was also obtained from her , who was present at today's visit  The following portions of the patient's history were reviewed and updated as appropriate: allergies, current medications and problem list      Objective:    Blood pressure 106/52, pulse 79, height 5' 4" (1 626 m), weight 92 2 kg (203 lb 3 2 oz)  Physical Exam    Neurological Exam      ROS:    Review of Systems   Constitutional: Positive for fatigue  HENT: Positive for congestion and tinnitus  Hearing loss   Eyes:        Blurred vision  Eye pain   Respiratory: Negative  Cardiovascular: Negative  Gastrointestinal: Negative  Endocrine: Negative  Genitourinary: Negative  Musculoskeletal: Positive for back pain, myalgias and neck pain  Joint pain  Pain while walking   Skin: Negative  Allergic/Immunologic: Negative  Neurological: Positive for light-headedness and headaches  Waking up at night  Memory problem  Numbness  Snoring  Balance problem  Difficulty walking  tingling   Hematological: Negative  Psychiatric/Behavioral: Positive for sleep disturbance  The patient is nervous/anxious           Mood swing  depression       I personally reviewed the ROS that was entered by the medical assistant

## 2018-07-11 NOTE — PATIENT INSTRUCTIONS
-- Continue your medications unchanged  -- Please schedule an appointment with physical therapy, since this will likely help your headaches  -- Please call our office if your headaches worsen, since we can see you in the office sooner if needed

## 2018-07-13 NOTE — ASSESSMENT & PLAN NOTE
She has not had any additional seizures and has been tolerating topiramate well without side effects  Because she is doing well, I will not make any changes to her medications today      --she will continue topiramate 150 milligrams twice a day

## 2018-07-13 NOTE — ASSESSMENT & PLAN NOTE
Her headaches have overall improved since her last visit  This is likely due to combination of having used tizanidine and having gotten occipital nerve blocks  I discussed that overall her headaches continue to have a component of cervicogenic headache  She continues to have a significant amount of muscle and neck soreness that least her headaches  She can continue to use tizanidine for this, but she may also benefit from working with physical therapy    I strongly encouraged her to work with PT

## 2018-10-29 DIAGNOSIS — E78.5 HYPERLIPIDEMIA, UNSPECIFIED HYPERLIPIDEMIA TYPE: ICD-10-CM

## 2018-10-29 DIAGNOSIS — I10 ESSENTIAL HYPERTENSION: ICD-10-CM

## 2018-10-29 RX ORDER — ATORVASTATIN CALCIUM 40 MG/1
40 TABLET, FILM COATED ORAL DAILY
Qty: 90 TABLET | Refills: 0 | Status: SHIPPED | OUTPATIENT
Start: 2018-10-29

## 2018-10-29 RX ORDER — HYDROCHLOROTHIAZIDE 25 MG/1
25 TABLET ORAL DAILY
Qty: 90 TABLET | Refills: 0 | Status: SHIPPED | OUTPATIENT
Start: 2018-10-29 | End: 2019-01-07 | Stop reason: SDUPTHER

## 2018-10-29 RX ORDER — LISINOPRIL 20 MG/1
20 TABLET ORAL DAILY
Qty: 90 TABLET | Refills: 0 | Status: SHIPPED | OUTPATIENT
Start: 2018-10-29 | End: 2019-04-29 | Stop reason: SDUPTHER

## 2018-10-29 NOTE — TELEPHONE ENCOUNTER
Patient wants 90 day refills on Atorvastatin 40 mg Lisinopril 20 mg Hydrochlorothiazide 25 mg to [facility] [address] [place] [address] Can be reached at [phone]

## 2018-11-02 ENCOUNTER — TELEPHONE (OUTPATIENT)
Dept: FAMILY MEDICINE CLINIC | Facility: CLINIC | Age: 63
End: 2018-11-02

## 2018-11-02 PROBLEM — E11.40 DIABETES MELLITUS WITH DIABETIC NEUROPATHY (HCC): Status: ACTIVE | Noted: 2017-05-03

## 2018-11-02 NOTE — TELEPHONE ENCOUNTER
Patient called - leaving tomorrow for [place] for 6 weeks  Called earlier in the week and requested refills - got a message that she needs to make an appointment and is glad to do so when she returns  Last seen 5/2018  Reviewing notes in chart refills were done but she said still needs lisinopril  I called to [facility] but EHR shows that all were refilled including this on 10/29/18

## 2018-11-03 NOTE — TELEPHONE ENCOUNTER
See note from Dr Dorita Valencia 11/2/18  Patient is leaving for 6 wk vacation and will make appt when she returns

## 2019-01-07 DIAGNOSIS — I10 ESSENTIAL HYPERTENSION: ICD-10-CM

## 2019-01-07 RX ORDER — HYDROCHLOROTHIAZIDE 25 MG/1
TABLET ORAL
Qty: 90 TABLET | Refills: 0 | Status: SHIPPED | OUTPATIENT
Start: 2019-01-07 | End: 2019-04-29 | Stop reason: SDUPTHER

## 2019-01-25 NOTE — TELEPHONE ENCOUNTER
3rd call: Lmom for patient to call/schedule follow-up appointment prior to next refill of medication  Postcard also sent to patient with this information

## 2019-04-02 ENCOUNTER — TELEPHONE (OUTPATIENT)
Dept: NEUROSURGERY | Facility: CLINIC | Age: 64
End: 2019-04-02

## 2019-04-29 DIAGNOSIS — I10 ESSENTIAL HYPERTENSION: ICD-10-CM

## 2019-04-29 RX ORDER — HYDROCHLOROTHIAZIDE 25 MG/1
TABLET ORAL
Qty: 90 TABLET | Refills: 0 | Status: CANCELLED | OUTPATIENT
Start: 2019-04-29

## 2019-04-30 RX ORDER — HYDROCHLOROTHIAZIDE 25 MG/1
TABLET ORAL
Qty: 90 TABLET | Refills: 0 | Status: SHIPPED | OUTPATIENT
Start: 2019-04-30

## 2019-04-30 RX ORDER — LISINOPRIL 20 MG/1
TABLET ORAL
Qty: 90 TABLET | Refills: 0 | Status: SHIPPED | OUTPATIENT
Start: 2019-04-30

## 2019-05-06 DIAGNOSIS — G40.209 LOCALIZATION-RELATED FOCAL EPILEPSY WITH COMPLEX PARTIAL SEIZURES (HCC): ICD-10-CM

## 2019-05-07 RX ORDER — TOPIRAMATE 100 MG/1
TABLET, FILM COATED ORAL
Qty: 90 TABLET | Refills: 11 | Status: SHIPPED | OUTPATIENT
Start: 2019-05-07

## 2020-04-26 DIAGNOSIS — G40.209 LOCALIZATION-RELATED FOCAL EPILEPSY WITH COMPLEX PARTIAL SEIZURES (HCC): ICD-10-CM

## 2020-04-30 RX ORDER — TOPIRAMATE 100 MG/1
TABLET, FILM COATED ORAL
Qty: 90 TABLET | Refills: 11 | OUTPATIENT
Start: 2020-04-30
